# Patient Record
Sex: FEMALE | Race: WHITE | Employment: FULL TIME | URBAN - METROPOLITAN AREA
[De-identification: names, ages, dates, MRNs, and addresses within clinical notes are randomized per-mention and may not be internally consistent; named-entity substitution may affect disease eponyms.]

---

## 2017-12-01 ENCOUNTER — OFFICE VISIT (OUTPATIENT)
Dept: INTERNAL MEDICINE CLINIC | Facility: CLINIC | Age: 57
End: 2017-12-01

## 2017-12-01 ENCOUNTER — TELEPHONE (OUTPATIENT)
Dept: INTERNAL MEDICINE CLINIC | Facility: CLINIC | Age: 57
End: 2017-12-01

## 2017-12-01 VITALS
SYSTOLIC BLOOD PRESSURE: 136 MMHG | HEIGHT: 64.5 IN | DIASTOLIC BLOOD PRESSURE: 70 MMHG | HEART RATE: 76 BPM | RESPIRATION RATE: 18 BRPM | BODY MASS INDEX: 24.62 KG/M2 | TEMPERATURE: 98 F | WEIGHT: 146 LBS

## 2017-12-01 DIAGNOSIS — Z86.011 H/O MENINGIOMA OF THE BRAIN: ICD-10-CM

## 2017-12-01 DIAGNOSIS — Z00.00 ROUTINE PHYSICAL EXAMINATION: Primary | ICD-10-CM

## 2017-12-01 DIAGNOSIS — R92.2 DENSE BREASTS: ICD-10-CM

## 2017-12-01 DIAGNOSIS — Z12.31 ENCOUNTER FOR SCREENING MAMMOGRAM FOR MALIGNANT NEOPLASM OF BREAST: ICD-10-CM

## 2017-12-01 DIAGNOSIS — Z12.11 COLON CANCER SCREENING: ICD-10-CM

## 2017-12-01 PROCEDURE — 99396 PREV VISIT EST AGE 40-64: CPT | Performed by: INTERNAL MEDICINE

## 2017-12-01 RX ORDER — VALACYCLOVIR HYDROCHLORIDE 1 G/1
TABLET, FILM COATED ORAL
COMMUNITY
Start: 2017-11-23 | End: 2019-09-17

## 2017-12-04 NOTE — PROGRESS NOTES
HPI:    Patient ID: Yaneli Lyle is a 62year old female.   Presents for physical exam.  HPI  Patient reports that she has been feeling well, she has history of meningioma left frontal removal in 2005, she was advised to have pediatric surveillance is not erythematous. Left Ear: Ear canal normal. Tympanic membrane is not erythematous. Mouth/Throat: Oropharynx is clear and moist. No posterior oropharyngeal erythema.    Eyes: Conjunctivae and EOM are normal. Pupils are equal, round, and reactive to

## 2017-12-07 ENCOUNTER — TELEPHONE (OUTPATIENT)
Dept: INTERNAL MEDICINE CLINIC | Facility: CLINIC | Age: 57
End: 2017-12-07

## 2017-12-07 NOTE — TELEPHONE ENCOUNTER
Pt said Dr Josesito Jauregui was to order brain MRI with/with out contrast  Order not in Epic     Saw Dr Josesito Jauregui 12/1

## 2017-12-09 ENCOUNTER — TELEPHONE (OUTPATIENT)
Dept: INTERNAL MEDICINE CLINIC | Facility: CLINIC | Age: 57
End: 2017-12-09

## 2017-12-09 DIAGNOSIS — Z86.018 HISTORY OF MENINGIOMA: Primary | ICD-10-CM

## 2017-12-19 ENCOUNTER — LAB ENCOUNTER (OUTPATIENT)
Dept: LAB | Age: 57
End: 2017-12-19
Attending: INTERNAL MEDICINE
Payer: COMMERCIAL

## 2017-12-19 DIAGNOSIS — Z00.00 ROUTINE PHYSICAL EXAMINATION: ICD-10-CM

## 2017-12-19 DIAGNOSIS — Z12.11 COLON CANCER SCREENING: ICD-10-CM

## 2017-12-19 PROCEDURE — 36415 COLL VENOUS BLD VENIPUNCTURE: CPT

## 2017-12-19 PROCEDURE — 84443 ASSAY THYROID STIM HORMONE: CPT

## 2017-12-19 PROCEDURE — 80061 LIPID PANEL: CPT

## 2017-12-19 PROCEDURE — 85025 COMPLETE CBC W/AUTO DIFF WBC: CPT

## 2017-12-19 PROCEDURE — 80053 COMPREHEN METABOLIC PANEL: CPT

## 2018-01-15 ENCOUNTER — APPOINTMENT (OUTPATIENT)
Dept: LAB | Age: 58
End: 2018-01-15
Attending: INTERNAL MEDICINE
Payer: COMMERCIAL

## 2018-01-15 LAB — HEMOCCULT STL QL: NEGATIVE

## 2018-01-15 PROCEDURE — 82274 ASSAY TEST FOR BLOOD FECAL: CPT

## 2018-01-16 ENCOUNTER — HOSPITAL ENCOUNTER (OUTPATIENT)
Dept: MAMMOGRAPHY | Age: 58
Discharge: HOME | End: 2018-01-16
Attending: INTERNAL MEDICINE
Payer: COMMERCIAL

## 2018-01-16 DIAGNOSIS — R92.2 DENSE BREASTS: ICD-10-CM

## 2018-01-16 DIAGNOSIS — Z12.31 ENCOUNTER FOR SCREENING MAMMOGRAM FOR MALIGNANT NEOPLASM OF BREAST: ICD-10-CM

## 2018-01-16 PROCEDURE — 77063 BREAST TOMOSYNTHESIS BI: CPT | Performed by: INTERNAL MEDICINE

## 2018-01-16 PROCEDURE — 77067 SCR MAMMO BI INCL CAD: CPT | Performed by: INTERNAL MEDICINE

## 2018-01-19 ENCOUNTER — PATIENT MESSAGE (OUTPATIENT)
Dept: INTERNAL MEDICINE CLINIC | Facility: CLINIC | Age: 58
End: 2018-01-19

## 2018-01-19 DIAGNOSIS — Z80.9 FAMILY HISTORY OF CANCER: Primary | ICD-10-CM

## 2018-01-20 NOTE — TELEPHONE ENCOUNTER
From: Shanita Yin  To: Aaliyah Vance MD  Sent: 1/19/2018 11:03 AM CST  Subject: Referral Request    Dr. Renata Cain,    My sister was just diagnosed with ovarian cancer stage four so I would appreciate a higher cancer screening referral.     Thank you

## 2018-01-22 ENCOUNTER — HOSPITAL ENCOUNTER (OUTPATIENT)
Dept: MRI IMAGING | Facility: HOSPITAL | Age: 58
Discharge: HOME | End: 2018-01-22
Attending: INTERNAL MEDICINE
Payer: COMMERCIAL

## 2018-01-22 DIAGNOSIS — Z86.018 HISTORY OF MENINGIOMA: ICD-10-CM

## 2018-01-22 PROCEDURE — A9575 INJ GADOTERATE MEGLUMI 0.1ML: HCPCS | Performed by: INTERNAL MEDICINE

## 2018-01-22 PROCEDURE — 70553 MRI BRAIN STEM W/O & W/DYE: CPT | Performed by: INTERNAL MEDICINE

## 2018-11-27 ENCOUNTER — TELEPHONE (OUTPATIENT)
Dept: RHEUMATOLOGY | Facility: CLINIC | Age: 58
End: 2018-11-27

## 2018-11-27 NOTE — TELEPHONE ENCOUNTER
Pt stopped by [place] office dropped off Health Provider Screening form she needs filled out. Pt asking for it to be faxed and pt called once it has been done. Placed in Dr Maggi An folder.

## 2018-11-28 ENCOUNTER — MED REC SCAN ONLY (OUTPATIENT)
Dept: INTERNAL MEDICINE CLINIC | Facility: CLINIC | Age: 58
End: 2018-11-28

## 2019-01-09 ENCOUNTER — TELEPHONE (OUTPATIENT)
Dept: GASTROENTEROLOGY | Facility: CLINIC | Age: 59
End: 2019-01-09

## 2019-01-09 DIAGNOSIS — Z12.11 COLON CANCER SCREENING: Primary | ICD-10-CM

## 2019-01-09 NOTE — TELEPHONE ENCOUNTER
LOV 02/16/16:    Assessment and plan:     This is a 17-year-old female who is a very good candidate for colorectal screening. She is ASA class II. I advised colonoscopy at this time.   The rationale, the risks, benefits, the alternatives, and the miss rat

## 2019-01-09 NOTE — TELEPHONE ENCOUNTER
Chart, medications, and recent visits with Dr. Corazon Pang reviewed.     Okay to schedule colonoscopy directly, diagnosis screening, split dose MiraLAX preparation, MAC at [facility] or IV sedation at \[facility]".

## 2019-01-09 NOTE — TELEPHONE ENCOUNTER
Pt states she got a letter to schedule procedure states she seen dr already and just needs to schedule please call

## 2019-01-14 NOTE — TELEPHONE ENCOUNTER
Scheduled for:  Colonoscopy 74620  Provider Name: Dr. Anais Jerry  Date:  3/7/19  Location:  [facility]  Sedation:  MAC  Time:   0730 (pt is aware to arrive at 0630 )   Prep:  2 day Miralax/Gatorade, sent via DJZ?:  Physician reviewed   Valente Urrutia

## 2019-03-07 ENCOUNTER — ANESTHESIA EVENT (OUTPATIENT)
Dept: ENDOSCOPY | Facility: HOSPITAL | Age: 59
End: 2019-03-07
Payer: COMMERCIAL

## 2019-03-07 ENCOUNTER — HOSPITAL ENCOUNTER (OUTPATIENT)
Facility: HOSPITAL | Age: 59
Setting detail: HOSPITAL OUTPATIENT SURGERY
Discharge: HOME | End: 2019-03-07
Attending: INTERNAL MEDICINE | Admitting: INTERNAL MEDICINE
Payer: COMMERCIAL

## 2019-03-07 ENCOUNTER — ANESTHESIA (OUTPATIENT)
Dept: ENDOSCOPY | Facility: HOSPITAL | Age: 59
End: 2019-03-07
Payer: COMMERCIAL

## 2019-03-07 VITALS
RESPIRATION RATE: 17 BRPM | OXYGEN SATURATION: 98 % | HEIGHT: 64 IN | DIASTOLIC BLOOD PRESSURE: 68 MMHG | HEART RATE: 53 BPM | WEIGHT: 150 LBS | BODY MASS INDEX: 25.61 KG/M2 | SYSTOLIC BLOOD PRESSURE: 107 MMHG

## 2019-03-07 DIAGNOSIS — K64.9 HEMORRHOIDS: Primary | ICD-10-CM

## 2019-03-07 DIAGNOSIS — Z12.11 COLON CANCER SCREENING: ICD-10-CM

## 2019-03-07 PROBLEM — K64.8 INTERNAL HEMORRHOIDS WITHOUT COMPLICATION: Status: ACTIVE | Noted: 2019-03-07

## 2019-03-07 PROCEDURE — 0DJD8ZZ INSPECTION OF LOWER INTESTINAL TRACT, VIA NATURAL OR ARTIFICIAL OPENING ENDOSCOPIC: ICD-10-PCS | Performed by: INTERNAL MEDICINE

## 2019-03-07 PROCEDURE — 45378 DIAGNOSTIC COLONOSCOPY: CPT | Performed by: INTERNAL MEDICINE

## 2019-03-07 RX ORDER — LIDOCAINE HYDROCHLORIDE 10 MG/ML
INJECTION, SOLUTION EPIDURAL; INFILTRATION; INTRACAUDAL; PERINEURAL AS NEEDED
Status: DISCONTINUED | OUTPATIENT
Start: 2019-03-07 | End: 2019-03-07 | Stop reason: SURG

## 2019-03-07 RX ORDER — MIDAZOLAM HYDROCHLORIDE 1 MG/ML
INJECTION INTRAMUSCULAR; INTRAVENOUS AS NEEDED
Status: DISCONTINUED | OUTPATIENT
Start: 2019-03-07 | End: 2019-03-07 | Stop reason: SURG

## 2019-03-07 RX ORDER — SODIUM CHLORIDE, SODIUM LACTATE, POTASSIUM CHLORIDE, CALCIUM CHLORIDE 600; 310; 30; 20 MG/100ML; MG/100ML; MG/100ML; MG/100ML
INJECTION, SOLUTION INTRAVENOUS CONTINUOUS
Status: DISCONTINUED | OUTPATIENT
Start: 2019-03-07 | End: 2019-03-07

## 2019-03-07 RX ADMIN — SODIUM CHLORIDE, SODIUM LACTATE, POTASSIUM CHLORIDE, CALCIUM CHLORIDE: 600; 310; 30; 20 INJECTION, SOLUTION INTRAVENOUS at 07:37:00

## 2019-03-07 RX ADMIN — SODIUM CHLORIDE, SODIUM LACTATE, POTASSIUM CHLORIDE, CALCIUM CHLORIDE: 600; 310; 30; 20 INJECTION, SOLUTION INTRAVENOUS at 07:59:00

## 2019-03-07 RX ADMIN — MIDAZOLAM HYDROCHLORIDE 2 MG: 1 INJECTION INTRAMUSCULAR; INTRAVENOUS at 07:39:00

## 2019-03-07 RX ADMIN — LIDOCAINE HYDROCHLORIDE 30 MG: 10 INJECTION, SOLUTION EPIDURAL; INFILTRATION; INTRACAUDAL; PERINEURAL at 07:39:00

## 2019-03-07 NOTE — BRIEF OP NOTE
Pre-Operative Diagnosis: Colon cancer screening [Z12.11]     Post-Operative Diagnosis: Normal colonoscopy, internal hemorrhoids     Procedure Performed:   Procedure(s):  COLONOSCOPY    Surgeon(s) and Role:     * Arpan Sanchez MD - Primary

## 2019-03-07 NOTE — ANESTHESIA POSTPROCEDURE EVALUATION
Patient: Travis Jackson    Procedure Summary     Date:  03/07/19 Room / Location:  [facility] ENDOSCOPY 05 / [facility] ENDOSCOPY    Anesthesia Start:  0124 Anesthesia Stop:  0804    Procedure:  COLONOSCOPY (N/A ) Diagnosis:       Colon cancer screening      (Norm

## 2019-03-07 NOTE — H&P
History & Physical Examination    Patient Name: Ana Maria Wagner  MRN: [id number]  [facility]: [id number]  YOB: 1960    Diagnosis: Colorectal screening      Medications Prior to Admission:  Probiotic Product (PROBIOTIC-10) Oral Cap Take by mouth Marlen Medina, [address] [place] [address] - Gastroenterology  3/7/2019  7:33 AM

## 2019-03-07 NOTE — ANESTHESIA PREPROCEDURE EVALUATION
Anesthesia PreOp Note    HPI:     Luciana Callahan is a 62year old female who presents for preoperative consultation requested by: Hamlet Hansen MD    Date of Surgery: 3/7/2019    Procedure(s):  COLONOSCOPY  Indication: Colon cancer scre Problem Relation Age of Onset   • Diabetes Father    • Arthritis Father         per NG osteoarthritis   • Ovarian Cancer Sister 62     Social History    Socioeconomic History      Marital status:       Spouse name: Not on file      Number of child Helmet: Not Asked        Seat Belt: Not Asked        Self-Exams: Not Asked    Social History Narrative      Not on file      Available pre-op labs reviewed. Vital Signs: Body mass index is 25.75 kg/m².    height is 1.626 m (5' 4\") and weight i

## 2019-03-07 NOTE — OPERATIVE REPORT
[facility]    PATIENT'S NAME: Uriah Fierrobird   ATTENDING PHYSICIAN: Lyla Smalls MD   OPERATING PHYSICIAN: Lyla Smalls MD   PATIENT ACCOUNT#:   [id number]    LOCATION:  [facility] ROOM 4 [facility] 8   [address] [facility] [id number]  EMS/

## 2019-03-08 ENCOUNTER — TELEPHONE (OUTPATIENT)
Dept: GASTROENTEROLOGY | Facility: CLINIC | Age: 59
End: 2019-03-08

## 2019-03-08 NOTE — TELEPHONE ENCOUNTER
Recall colon in 10 years per EBS.  Colon done 03/07/19    Snapshot updated and recall entered per protocol

## 2019-09-10 ENCOUNTER — OFFICE VISIT (OUTPATIENT)
Dept: INTERNAL MEDICINE CLINIC | Facility: CLINIC | Age: 59
End: 2019-09-10
Payer: COMMERCIAL

## 2019-09-10 VITALS
SYSTOLIC BLOOD PRESSURE: 115 MMHG | HEART RATE: 77 BPM | HEIGHT: 64 IN | WEIGHT: 151 LBS | BODY MASS INDEX: 25.78 KG/M2 | DIASTOLIC BLOOD PRESSURE: 70 MMHG

## 2019-09-10 DIAGNOSIS — Z12.31 BREAST CANCER SCREENING BY MAMMOGRAM: ICD-10-CM

## 2019-09-10 DIAGNOSIS — Z12.72 ENCOUNTER FOR PAPANICOLAOU SMEAR OF VAGINA AS PART OF ROUTINE GYNECOLOGICAL EXAMINATION: ICD-10-CM

## 2019-09-10 DIAGNOSIS — Z00.00 PHYSICAL EXAM, ANNUAL: Primary | ICD-10-CM

## 2019-09-10 DIAGNOSIS — Z01.419 ENCOUNTER FOR PAPANICOLAOU SMEAR OF VAGINA AS PART OF ROUTINE GYNECOLOGICAL EXAMINATION: ICD-10-CM

## 2019-09-10 PROCEDURE — 99396 PREV VISIT EST AGE 40-64: CPT | Performed by: INTERNAL MEDICINE

## 2019-09-10 NOTE — PROGRESS NOTES
HPI:    Patient ID: Clemente Clements is a 62year old female. Presents for physical exam GYN exam  HPI   patient has been feeling well, she has no new concerns, she eats healthy and exercises regularly.   Patient is menopausal, she has history of colon /70 (BP Location: Right arm, Patient Position: Sitting, Cuff Size: adult)   Pulse 77   Ht 5' 4\" (1.626 m)   Wt 151 lb (68.5 kg)   LMP 09/17/2010   BMI 25.92 kg/m²    Physical Exam    Constitutional: She is oriented to person, place, and time.  She Physical exam, annual  (primary encounter diagnosis) continue healthy diet regular physical activities, will check labs  Breast cancer screening by mammogram  Encounter for papanicolaou smear of vagina as part of routine gynecological examination follow-

## 2019-09-11 LAB — HPV I/H RISK 1 DNA SPEC QL NAA+PROBE: NEGATIVE

## 2019-09-13 ENCOUNTER — HOSPITAL ENCOUNTER (OUTPATIENT)
Dept: MAMMOGRAPHY | Age: 59
Discharge: HOME | End: 2019-09-13
Attending: INTERNAL MEDICINE
Payer: COMMERCIAL

## 2019-09-13 DIAGNOSIS — Z12.31 BREAST CANCER SCREENING BY MAMMOGRAM: ICD-10-CM

## 2019-09-13 PROCEDURE — 77063 BREAST TOMOSYNTHESIS BI: CPT | Performed by: INTERNAL MEDICINE

## 2019-09-13 PROCEDURE — 77067 SCR MAMMO BI INCL CAD: CPT | Performed by: INTERNAL MEDICINE

## 2019-09-14 ENCOUNTER — LAB ENCOUNTER (OUTPATIENT)
Dept: LAB | Age: 59
End: 2019-09-14
Attending: INTERNAL MEDICINE
Payer: COMMERCIAL

## 2019-09-14 DIAGNOSIS — Z00.00 PHYSICAL EXAM, ANNUAL: ICD-10-CM

## 2019-09-14 LAB
ALBUMIN SERPL-MCNC: 4 G/DL (ref 3.4–5)
ALBUMIN/GLOB SERPL: 1.4 {RATIO} (ref 1–2)
ALP LIVER SERPL-CCNC: 90 U/L (ref 46–118)
ALT SERPL-CCNC: 17 U/L (ref 13–56)
ANION GAP SERPL CALC-SCNC: 6 MMOL/L (ref 0–18)
AST SERPL-CCNC: 14 U/L (ref 15–37)
BASOPHILS # BLD AUTO: 0.04 X10(3) UL (ref 0–0.2)
BASOPHILS NFR BLD AUTO: 0.8 %
BILIRUB SERPL-MCNC: 0.6 MG/DL (ref 0.1–2)
BUN BLD-MCNC: 17 MG/DL (ref 7–18)
BUN/CREAT SERPL: 14.9 (ref 10–20)
CALCIUM BLD-MCNC: 9.3 MG/DL (ref 8.5–10.1)
CHLORIDE SERPL-SCNC: 107 MMOL/L (ref 98–112)
CHOLEST SMN-MCNC: 225 MG/DL (ref ?–200)
CO2 SERPL-SCNC: 28 MMOL/L (ref 21–32)
CREAT BLD-MCNC: 1.14 MG/DL (ref 0.55–1.02)
DEPRECATED RDW RBC AUTO: 41 FL (ref 35.1–46.3)
EOSINOPHIL # BLD AUTO: 0.09 X10(3) UL (ref 0–0.7)
EOSINOPHIL NFR BLD AUTO: 1.9 %
ERYTHROCYTE [DISTWIDTH] IN BLOOD BY AUTOMATED COUNT: 11.9 % (ref 11–15)
GLOBULIN PLAS-MCNC: 2.9 G/DL (ref 2.8–4.4)
GLUCOSE BLD-MCNC: 93 MG/DL (ref 70–99)
HCT VFR BLD AUTO: 38.9 % (ref 35–48)
HDLC SERPL-MCNC: 73 MG/DL (ref 40–59)
HGB BLD-MCNC: 12.8 G/DL (ref 12–16)
IMM GRANULOCYTES # BLD AUTO: 0.01 X10(3) UL (ref 0–1)
IMM GRANULOCYTES NFR BLD: 0.2 %
LDLC SERPL CALC-MCNC: 130 MG/DL (ref ?–100)
LYMPHOCYTES # BLD AUTO: 1.94 X10(3) UL (ref 1–4)
LYMPHOCYTES NFR BLD AUTO: 41.2 %
M PROTEIN MFR SERPL ELPH: 6.9 G/DL (ref 6.4–8.2)
MCH RBC QN AUTO: 31.2 PG (ref 26–34)
MCHC RBC AUTO-ENTMCNC: 32.9 G/DL (ref 31–37)
MCV RBC AUTO: 94.9 FL (ref 80–100)
MONOCYTES # BLD AUTO: 0.46 X10(3) UL (ref 0.1–1)
MONOCYTES NFR BLD AUTO: 9.8 %
NEUTROPHILS # BLD AUTO: 2.17 X10 (3) UL (ref 1.5–7.7)
NEUTROPHILS # BLD AUTO: 2.17 X10(3) UL (ref 1.5–7.7)
NEUTROPHILS NFR BLD AUTO: 46.1 %
NONHDLC SERPL-MCNC: 152 MG/DL (ref ?–130)
OSMOLALITY SERPL CALC.SUM OF ELEC: 293 MOSM/KG (ref 275–295)
PATIENT FASTING: YES
PATIENT FASTING: YES
PLATELET # BLD AUTO: 269 10(3)UL (ref 150–450)
POTASSIUM SERPL-SCNC: 4 MMOL/L (ref 3.5–5.1)
RBC # BLD AUTO: 4.1 X10(6)UL (ref 3.8–5.3)
SODIUM SERPL-SCNC: 141 MMOL/L (ref 136–145)
TRIGL SERPL-MCNC: 112 MG/DL (ref 30–149)
TSI SER-ACNC: 3.48 MIU/ML (ref 0.36–3.74)
VLDLC SERPL CALC-MCNC: 22 MG/DL (ref 0–30)
WBC # BLD AUTO: 4.7 X10(3) UL (ref 4–11)

## 2019-09-14 PROCEDURE — 80053 COMPREHEN METABOLIC PANEL: CPT

## 2019-09-14 PROCEDURE — 80061 LIPID PANEL: CPT

## 2019-09-14 PROCEDURE — 85025 COMPLETE CBC W/AUTO DIFF WBC: CPT

## 2019-09-14 PROCEDURE — 84443 ASSAY THYROID STIM HORMONE: CPT

## 2019-09-14 PROCEDURE — 36415 COLL VENOUS BLD VENIPUNCTURE: CPT

## 2019-09-17 ENCOUNTER — HOSPITAL ENCOUNTER (OUTPATIENT)
Dept: ULTRASOUND IMAGING | Facility: HOSPITAL | Age: 59
Discharge: HOME | End: 2019-09-17
Attending: INTERNAL MEDICINE
Payer: COMMERCIAL

## 2019-09-17 ENCOUNTER — HOSPITAL ENCOUNTER (OUTPATIENT)
Dept: MAMMOGRAPHY | Facility: HOSPITAL | Age: 59
Discharge: HOME | End: 2019-09-17
Attending: INTERNAL MEDICINE
Payer: COMMERCIAL

## 2019-09-17 DIAGNOSIS — R92.8 ABNORMAL MAMMOGRAM: ICD-10-CM

## 2019-09-17 PROCEDURE — 77066 DX MAMMO INCL CAD BI: CPT | Performed by: INTERNAL MEDICINE

## 2019-09-17 PROCEDURE — 77062 BREAST TOMOSYNTHESIS BI: CPT | Performed by: INTERNAL MEDICINE

## 2019-09-17 PROCEDURE — 76642 ULTRASOUND BREAST LIMITED: CPT | Performed by: INTERNAL MEDICINE

## 2019-09-17 NOTE — IMAGING NOTE
This nurse introduced self and role of breast coordinator. Discussed recommended breast biopsy with patient. Pt was recommended by DR Renetta Camarena have a  Left breast ultrasound guided biopsy. She has 2 children dtr 21 son 25. She is a er nurse .  Yohannes compression views. No ultrasound correlate was seen. Six-month follow-up with the unilateral mammogram would be recommended. 2. Small 3 x 4 mm hypoechoic mass in the left upper outer quadrant at 0200 hours.   This could correspond to the mammographic fin effient brilinta   eloquis etc) should be held at the  direction of your physician. Radiology's preference is to hold these medications for 5  days prior to biopsy. Denies usage  age Reviewed US guided biopsy procedure, as below.   You will be lying on sports bra or form fitting bra on day of procedure. Educated patient that this helps with comfort after the biopsy and decrease swelling.   Reviewed results process with patient and shared that pathology results will be available within 2-3 business days of

## 2019-09-18 ENCOUNTER — TELEPHONE (OUTPATIENT)
Dept: OTHER | Age: 59
End: 2019-09-18

## 2019-09-18 NOTE — TELEPHONE ENCOUNTER
Patient returning Dr Marcin Hahn call regarding mammogram results.      Notes recorded by Destin Hester MD on 9/17/2019 at 8:13 PM CDT  lmtcb

## 2019-09-18 NOTE — TELEPHONE ENCOUNTER
Patient calling stated that spoke to Dr Corazon Pang this morning. She tried calling scheduling but can not schedule because no order was put in the system. Put patient on hold and Lync'd Dr Corazon Pang.      [9/18/2019 3:51 PM] Polly Perez MD:   i will put  order

## 2019-09-26 ENCOUNTER — HOSPITAL ENCOUNTER (OUTPATIENT)
Dept: MAMMOGRAPHY | Facility: HOSPITAL | Age: 59
Discharge: HOME | End: 2019-09-26
Attending: INTERNAL MEDICINE
Payer: COMMERCIAL

## 2019-09-26 ENCOUNTER — HOSPITAL ENCOUNTER (OUTPATIENT)
Dept: ULTRASOUND IMAGING | Facility: HOSPITAL | Age: 59
Discharge: HOME | End: 2019-09-26
Attending: INTERNAL MEDICINE
Payer: COMMERCIAL

## 2019-09-26 VITALS — HEART RATE: 73 BPM | SYSTOLIC BLOOD PRESSURE: 131 MMHG | DIASTOLIC BLOOD PRESSURE: 82 MMHG

## 2019-09-26 DIAGNOSIS — N63.20 MASS OF LEFT BREAST: ICD-10-CM

## 2019-09-26 PROCEDURE — 19083 BX BREAST 1ST LESION US IMAG: CPT | Performed by: INTERNAL MEDICINE

## 2019-09-26 PROCEDURE — 77065 DX MAMMO INCL CAD UNI: CPT | Performed by: INTERNAL MEDICINE

## 2019-09-26 PROCEDURE — 88305 TISSUE EXAM BY PATHOLOGIST: CPT | Performed by: INTERNAL MEDICINE

## 2019-09-27 NOTE — IMAGING NOTE
Steffen Monday  is s/p biopsy . Phoned and introduced myself as breast coordinator . Reinforced to patient  post biopsy care and instructions . She was instructed to come back in 2 weeks for a mammogram to confirm clip placement.  There was post b

## 2019-09-30 ENCOUNTER — NURSE NAVIGATOR ENCOUNTER (OUTPATIENT)
Dept: MAMMOGRAPHY | Facility: HOSPITAL | Age: 59
End: 2019-09-30

## 2019-09-30 ENCOUNTER — TELEPHONE (OUTPATIENT)
Dept: MAMMOGRAPHY | Facility: HOSPITAL | Age: 59
End: 2019-09-30

## 2019-09-30 ENCOUNTER — TELEPHONE (OUTPATIENT)
Dept: INTERNAL MEDICINE CLINIC | Facility: CLINIC | Age: 59
End: 2019-09-30

## 2019-09-30 ENCOUNTER — TELEPHONE (OUTPATIENT)
Dept: OTHER | Age: 59
End: 2019-09-30

## 2019-09-30 NOTE — TELEPHONE ENCOUNTER
Nancy Snyder, Breast Coordinator, called to inform Dr. Ghulam Feldman radiologist had subsequent recommendations regarding pt's breast biopsy. Nancy Snyder noted Dr. Ghulam Feldman had talked to pt about the results before final results available.  Nancy Snyder called pt to review updated resu

## 2019-09-30 NOTE — TELEPHONE ENCOUNTER
Returned call responding to email sent to this  Rn. I  Had contacted Mrs Ector Salcedo to inform her that I had reached out to her physician for a dx mammogram with stereotactic biopsy if needed.  Informed Mrs Yovani Mitchell  That we would schedule her t

## 2019-09-30 NOTE — TELEPHONE ENCOUNTER
Spoke to Jonathon Rivero, order placed for requested testing, left message for the patient to call back

## 2019-10-01 ENCOUNTER — TELEPHONE (OUTPATIENT)
Dept: INTERNAL MEDICINE CLINIC | Facility: CLINIC | Age: 59
End: 2019-10-01

## 2019-10-01 NOTE — TELEPHONE ENCOUNTER
Wali Sanchez from the radiology dept called stating pt needs new order for mammogram with ana    Or even notate it in the comments

## 2019-10-01 NOTE — TELEPHONE ENCOUNTER
Spoke to patient, she is aware of radiology recommendation to have left breast diagnostic mammogram and possible stereotactic biopsy if needed, I advised that they place orders, she has already appointment for this coming Thursday

## 2019-10-03 ENCOUNTER — HOSPITAL ENCOUNTER (OUTPATIENT)
Dept: MAMMOGRAPHY | Facility: HOSPITAL | Age: 59
Discharge: HOME | End: 2019-10-03
Attending: INTERNAL MEDICINE
Payer: COMMERCIAL

## 2019-10-03 ENCOUNTER — APPOINTMENT (OUTPATIENT)
Dept: MAMMOGRAPHY | Facility: HOSPITAL | Age: 59
End: 2019-10-03
Attending: INTERNAL MEDICINE
Payer: COMMERCIAL

## 2019-10-03 ENCOUNTER — TELEPHONE (OUTPATIENT)
Dept: MAMMOGRAPHY | Facility: HOSPITAL | Age: 59
End: 2019-10-03

## 2019-10-03 DIAGNOSIS — R92.8 ABNORMAL MAMMOGRAM: ICD-10-CM

## 2019-10-03 DIAGNOSIS — R92.8 ABNORMAL MAMMOGRAM OF LEFT BREAST: ICD-10-CM

## 2019-10-03 NOTE — TELEPHONE ENCOUNTER
Dr Gabby Mendez requesting this rn contact Mrs Stephanie Cruz . After reviewing images  Dr Ynes Centeno the area they are looking at today is very small and that the hematoma may not be cleared enough to see the biopsy site .  If  Dr Selin Taylor

## 2019-10-10 ENCOUNTER — TELEPHONE (OUTPATIENT)
Dept: INTERNAL MEDICINE CLINIC | Facility: CLINIC | Age: 59
End: 2019-10-10

## 2019-10-10 NOTE — TELEPHONE ENCOUNTER
Pt came in to [facility]  to drop off [address] forms, placed in Herb Villagomez. Please fax to number on form and call pt when completed.

## 2020-03-02 ENCOUNTER — HOSPITAL ENCOUNTER (OUTPATIENT)
Dept: MRI IMAGING | Facility: HOSPITAL | Age: 60
Discharge: HOME | End: 2020-03-02
Attending: ORTHOPAEDIC SURGERY
Payer: COMMERCIAL

## 2020-03-02 DIAGNOSIS — M79.18 BUTTOCK PAIN: ICD-10-CM

## 2020-03-02 PROCEDURE — 72195 MRI PELVIS W/O DYE: CPT | Performed by: ORTHOPAEDIC SURGERY

## 2020-03-02 PROCEDURE — 72148 MRI LUMBAR SPINE W/O DYE: CPT | Performed by: ORTHOPAEDIC SURGERY

## 2020-03-13 ENCOUNTER — ORDER TRANSCRIPTION (OUTPATIENT)
Dept: ADMINISTRATIVE | Facility: HOSPITAL | Age: 60
End: 2020-03-13

## 2020-03-13 ENCOUNTER — HOSPITAL ENCOUNTER (OUTPATIENT)
Dept: BONE DENSITY | Age: 60
Discharge: HOME | End: 2020-03-13
Attending: INTERNAL MEDICINE
Payer: COMMERCIAL

## 2020-03-13 DIAGNOSIS — N95.1 MENOPAUSAL STATE: ICD-10-CM

## 2020-03-13 DIAGNOSIS — Z13.9 SCREENING PROCEDURE: Primary | ICD-10-CM

## 2020-03-13 PROCEDURE — 77080 DXA BONE DENSITY AXIAL: CPT | Performed by: INTERNAL MEDICINE

## 2020-04-14 ENCOUNTER — HOSPITAL ENCOUNTER (OUTPATIENT)
Dept: MAMMOGRAPHY | Facility: HOSPITAL | Age: 60
Discharge: HOME | End: 2020-04-14
Attending: INTERNAL MEDICINE
Payer: COMMERCIAL

## 2020-04-14 DIAGNOSIS — R92.8 ABNORMAL MAMMOGRAM OF BOTH BREASTS: ICD-10-CM

## 2020-04-14 PROCEDURE — 77062 BREAST TOMOSYNTHESIS BI: CPT | Performed by: INTERNAL MEDICINE

## 2020-04-14 PROCEDURE — 77066 DX MAMMO INCL CAD BI: CPT | Performed by: INTERNAL MEDICINE

## 2020-05-15 ENCOUNTER — TELEPHONE (OUTPATIENT)
Dept: RHEUMATOLOGY | Facility: CLINIC | Age: 60
End: 2020-05-15

## 2020-05-15 NOTE — TELEPHONE ENCOUNTER
Patient scheduled appointment for 5/18 and called in to do travel screening. Patient works at the [employer] for [employer], so she is exposed to patients with COVID daily. Patient noted that she is always in full PPE and does not have any symptoms.

## 2020-05-18 ENCOUNTER — OFFICE VISIT (OUTPATIENT)
Dept: RHEUMATOLOGY | Facility: CLINIC | Age: 60
End: 2020-05-18
Payer: COMMERCIAL

## 2020-05-18 ENCOUNTER — HOSPITAL ENCOUNTER (OUTPATIENT)
Dept: GENERAL RADIOLOGY | Facility: HOSPITAL | Age: 60
Discharge: HOME | End: 2020-05-18
Attending: INTERNAL MEDICINE
Payer: COMMERCIAL

## 2020-05-18 ENCOUNTER — APPOINTMENT (OUTPATIENT)
Dept: LAB | Facility: HOSPITAL | Age: 60
End: 2020-05-18
Attending: INTERNAL MEDICINE
Payer: COMMERCIAL

## 2020-05-18 VITALS
BODY MASS INDEX: 23.59 KG/M2 | WEIGHT: 138.19 LBS | DIASTOLIC BLOOD PRESSURE: 60 MMHG | HEART RATE: 75 BPM | HEIGHT: 64 IN | SYSTOLIC BLOOD PRESSURE: 110 MMHG

## 2020-05-18 DIAGNOSIS — M79.642 BILATERAL HAND PAIN: Primary | ICD-10-CM

## 2020-05-18 DIAGNOSIS — E55.9 VITAMIN D DEFICIENCY: ICD-10-CM

## 2020-05-18 DIAGNOSIS — M79.641 BILATERAL HAND PAIN: ICD-10-CM

## 2020-05-18 DIAGNOSIS — M79.641 BILATERAL HAND PAIN: Primary | ICD-10-CM

## 2020-05-18 DIAGNOSIS — M19.041 PRIMARY OSTEOARTHRITIS OF BOTH HANDS: ICD-10-CM

## 2020-05-18 DIAGNOSIS — M79.642 BILATERAL HAND PAIN: ICD-10-CM

## 2020-05-18 DIAGNOSIS — M19.042 PRIMARY OSTEOARTHRITIS OF BOTH HANDS: ICD-10-CM

## 2020-05-18 DIAGNOSIS — R89.9 ABNORMAL LABORATORY TEST RESULT: ICD-10-CM

## 2020-05-18 PROCEDURE — 85652 RBC SED RATE AUTOMATED: CPT | Performed by: INTERNAL MEDICINE

## 2020-05-18 PROCEDURE — 86431 RHEUMATOID FACTOR QUANT: CPT | Performed by: INTERNAL MEDICINE

## 2020-05-18 PROCEDURE — 86200 CCP ANTIBODY: CPT | Performed by: INTERNAL MEDICINE

## 2020-05-18 PROCEDURE — 82306 VITAMIN D 25 HYDROXY: CPT

## 2020-05-18 PROCEDURE — 73130 X-RAY EXAM OF HAND: CPT | Performed by: INTERNAL MEDICINE

## 2020-05-18 PROCEDURE — 80048 BASIC METABOLIC PNL TOTAL CA: CPT

## 2020-05-18 PROCEDURE — 36415 COLL VENOUS BLD VENIPUNCTURE: CPT | Performed by: INTERNAL MEDICINE

## 2020-05-18 PROCEDURE — 86140 C-REACTIVE PROTEIN: CPT | Performed by: INTERNAL MEDICINE

## 2020-05-18 PROCEDURE — 99244 OFF/OP CNSLTJ NEW/EST MOD 40: CPT | Performed by: INTERNAL MEDICINE

## 2020-05-18 NOTE — PATIENT INSTRUCTIONS
You were seen today for hand pain  Seems like your symptoms are more osteoarthritis related  Lets get more blood work

## 2020-05-18 NOTE — PROGRESS NOTES
Javier Zayas is a 61year old female who presents for Patient presents with:  Hand Pain: bilateral hand pain x 6 months, nodules on both hands   .    HPI:   CC: b/l hand pain  Consult: referred by PCP Dr. Miguel Wilson    This is a 62 yo F with gx of [address] HISTORY Right 1997    per NG benign removed (rt arm)   • REPAIR ROTATOR CUFF,ACUTE     • TUBAL LIGATION     • UPPER ARM/ELBOW SURGERY UNLISTED  2010    per NG shoulder surgery      Family History   Problem Relation Age of Onset   • Diabetes Father    • Art DIP, PIP and MCP, strong full fists, +heberden nodes   Wrist: FROM, no pain or swelling or warmth on palpation  Elbow: FROM, no pain or swelling or warmth on palpation  Shoulders: FROM, no pain or swelling or warmth on palpation  Hip: normal log roll, no l

## 2020-07-01 DIAGNOSIS — Z78.9 PARTICIPANT IN HEALTH AND WELLNESS PLAN: Primary | ICD-10-CM

## 2020-07-02 ENCOUNTER — NURSE ONLY (OUTPATIENT)
Dept: LAB | Age: 60
End: 2020-07-02
Attending: PREVENTIVE MEDICINE

## 2020-07-02 DIAGNOSIS — Z78.9 PARTICIPANT IN HEALTH AND WELLNESS PLAN: ICD-10-CM

## 2020-07-02 PROCEDURE — 86769 SARS-COV-2 COVID-19 ANTIBODY: CPT

## 2020-07-03 LAB — SARS-COV-2 IGG SERPLBLD QL IA.RAPID: NEGATIVE

## 2020-10-12 ENCOUNTER — HOSPITAL ENCOUNTER (OUTPATIENT)
Dept: MAMMOGRAPHY | Facility: HOSPITAL | Age: 60
Discharge: HOME | End: 2020-10-12
Attending: INTERNAL MEDICINE
Payer: COMMERCIAL

## 2020-10-12 DIAGNOSIS — R92.8 ABNORMAL MAMMOGRAM OF BOTH BREASTS: ICD-10-CM

## 2020-10-12 PROCEDURE — 77062 BREAST TOMOSYNTHESIS BI: CPT | Performed by: INTERNAL MEDICINE

## 2020-10-12 PROCEDURE — 77066 DX MAMMO INCL CAD BI: CPT | Performed by: INTERNAL MEDICINE

## 2020-11-13 ENCOUNTER — TELEPHONE (OUTPATIENT)
Dept: INTERNAL MEDICINE CLINIC | Facility: CLINIC | Age: 60
End: 2020-11-13

## 2020-11-13 NOTE — TELEPHONE ENCOUNTER
1st call - left message to patient voice mail.
Patient called and advised that she needs to have her physical done before the end of November for her work. Is there any way we can squeeze her in on another day? She advised she works 4 days a week and her scheduled is inconsistent. She advised she is off work on 11/17 & 11/9. She is at work and we CAN leave a voicemail for her if she does not answer      Please Advise.
Scheduled for 11/17
pelase fit pt in on 11-, use SDS
Normal rate, regular rhythm, normal S1, S2 heart sounds heard.

## 2020-11-14 ENCOUNTER — LAB ENCOUNTER (OUTPATIENT)
Dept: LAB | Age: 60
End: 2020-11-14
Attending: INTERNAL MEDICINE
Payer: COMMERCIAL

## 2020-11-14 DIAGNOSIS — Z00.00 PHYSICAL EXAM, ANNUAL: ICD-10-CM

## 2020-11-14 DIAGNOSIS — R89.9 ABNORMAL LABORATORY TEST RESULT: ICD-10-CM

## 2020-11-14 PROCEDURE — 80061 LIPID PANEL: CPT

## 2020-11-14 PROCEDURE — 85025 COMPLETE CBC W/AUTO DIFF WBC: CPT

## 2020-11-14 PROCEDURE — 80053 COMPREHEN METABOLIC PANEL: CPT

## 2020-11-14 PROCEDURE — 84443 ASSAY THYROID STIM HORMONE: CPT

## 2020-11-14 PROCEDURE — 36415 COLL VENOUS BLD VENIPUNCTURE: CPT

## 2020-11-17 ENCOUNTER — OFFICE VISIT (OUTPATIENT)
Dept: INTERNAL MEDICINE CLINIC | Facility: CLINIC | Age: 60
End: 2020-11-17
Payer: COMMERCIAL

## 2020-11-17 VITALS
HEIGHT: 64 IN | HEART RATE: 68 BPM | BODY MASS INDEX: 22.88 KG/M2 | WEIGHT: 134 LBS | DIASTOLIC BLOOD PRESSURE: 75 MMHG | TEMPERATURE: 98 F | SYSTOLIC BLOOD PRESSURE: 126 MMHG | RESPIRATION RATE: 18 BRPM

## 2020-11-17 DIAGNOSIS — Z00.00 PHYSICAL EXAM, ANNUAL: Primary | ICD-10-CM

## 2020-11-17 PROCEDURE — 99072 ADDL SUPL MATRL&STAF TM PHE: CPT | Performed by: INTERNAL MEDICINE

## 2020-11-17 PROCEDURE — 3078F DIAST BP <80 MM HG: CPT | Performed by: INTERNAL MEDICINE

## 2020-11-17 PROCEDURE — 3008F BODY MASS INDEX DOCD: CPT | Performed by: INTERNAL MEDICINE

## 2020-11-17 PROCEDURE — 99396 PREV VISIT EST AGE 40-64: CPT | Performed by: INTERNAL MEDICINE

## 2020-11-17 PROCEDURE — 3074F SYST BP LT 130 MM HG: CPT | Performed by: INTERNAL MEDICINE

## 2020-11-22 NOTE — PROGRESS NOTES
HPI:    Patient ID: Allison Saldivar is a 61year old female presents for physical exam    HPI  Patient reports that she has been doing well, she is exercising regularly, eats healthy, she works as a nurse at [employer].   She sees gynecology normal extraocular motion is intact  Ears/Audiometry: tympanic membranes are normal bilaterally hearing is grossly intact  Nose/Mouth/Throat: nose and throat are clear palate is intact mucous membranes are moist no oral lesions are noted  Neck/Thyroid: [employer]

## 2021-01-05 ENCOUNTER — IMMUNIZATION (OUTPATIENT)
Dept: LAB | Facility: HOSPITAL | Age: 61
End: 2021-01-05
Attending: PREVENTIVE MEDICINE

## 2021-01-05 DIAGNOSIS — Z23 NEED FOR VACCINATION: ICD-10-CM

## 2021-01-05 PROCEDURE — 0011A SARSCOV2 VAC 100MCG/0.5ML IM: CPT

## 2021-02-02 ENCOUNTER — IMMUNIZATION (OUTPATIENT)
Dept: LAB | Facility: HOSPITAL | Age: 61
End: 2021-02-02
Attending: PREVENTIVE MEDICINE

## 2021-02-02 DIAGNOSIS — Z23 NEED FOR VACCINATION: Primary | ICD-10-CM

## 2021-02-02 PROCEDURE — 0012A SARSCOV2 VAC 100MCG/0.5ML IM: CPT

## 2021-10-27 ENCOUNTER — TELEPHONE (OUTPATIENT)
Dept: INTERNAL MEDICINE CLINIC | Facility: CLINIC | Age: 61
End: 2021-10-27

## 2021-11-09 ENCOUNTER — HOSPITAL ENCOUNTER (OUTPATIENT)
Dept: MAMMOGRAPHY | Age: 61
Discharge: HOME | End: 2021-11-09
Attending: INTERNAL MEDICINE
Payer: COMMERCIAL

## 2021-11-09 DIAGNOSIS — Z12.31 ENCOUNTER FOR SCREENING MAMMOGRAM FOR MALIGNANT NEOPLASM OF BREAST: ICD-10-CM

## 2021-11-09 PROCEDURE — 77063 BREAST TOMOSYNTHESIS BI: CPT | Performed by: INTERNAL MEDICINE

## 2021-11-09 PROCEDURE — 77067 SCR MAMMO BI INCL CAD: CPT | Performed by: INTERNAL MEDICINE

## 2021-11-13 ENCOUNTER — LAB ENCOUNTER (OUTPATIENT)
Dept: LAB | Facility: HOSPITAL | Age: 61
End: 2021-11-13
Attending: INTERNAL MEDICINE
Payer: COMMERCIAL

## 2021-11-13 DIAGNOSIS — Z00.00 PHYSICAL EXAM, ANNUAL: ICD-10-CM

## 2021-11-13 PROCEDURE — 84443 ASSAY THYROID STIM HORMONE: CPT

## 2021-11-13 PROCEDURE — 85025 COMPLETE CBC W/AUTO DIFF WBC: CPT

## 2021-11-13 PROCEDURE — 80053 COMPREHEN METABOLIC PANEL: CPT

## 2021-11-13 PROCEDURE — 36415 COLL VENOUS BLD VENIPUNCTURE: CPT

## 2021-11-13 PROCEDURE — 80061 LIPID PANEL: CPT

## 2021-11-18 ENCOUNTER — HOSPITAL ENCOUNTER (OUTPATIENT)
Dept: MRI IMAGING | Facility: HOSPITAL | Age: 61
Discharge: HOME | End: 2021-11-18
Attending: INTERNAL MEDICINE
Payer: COMMERCIAL

## 2021-11-18 DIAGNOSIS — Z86.018 PERSONAL HISTORY OF OTHER BENIGN NEOPLASM: ICD-10-CM

## 2021-11-18 DIAGNOSIS — Z12.31 ENCOUNTER FOR SCREENING MAMMOGRAM FOR MALIGNANT NEOPLASM OF BREAST: ICD-10-CM

## 2021-11-18 PROCEDURE — A9575 INJ GADOTERATE MEGLUMI 0.1ML: HCPCS | Performed by: INTERNAL MEDICINE

## 2021-11-18 PROCEDURE — 70553 MRI BRAIN STEM W/O & W/DYE: CPT | Performed by: INTERNAL MEDICINE

## 2021-11-19 ENCOUNTER — OFFICE VISIT (OUTPATIENT)
Dept: INTERNAL MEDICINE CLINIC | Facility: CLINIC | Age: 61
End: 2021-11-19
Payer: COMMERCIAL

## 2021-11-19 VITALS
DIASTOLIC BLOOD PRESSURE: 70 MMHG | HEIGHT: 64 IN | RESPIRATION RATE: 18 BRPM | HEART RATE: 71 BPM | BODY MASS INDEX: 23.25 KG/M2 | WEIGHT: 136.19 LBS | SYSTOLIC BLOOD PRESSURE: 114 MMHG

## 2021-11-19 DIAGNOSIS — Z00.00 PHYSICAL EXAM, ANNUAL: Primary | ICD-10-CM

## 2021-11-19 PROCEDURE — 3008F BODY MASS INDEX DOCD: CPT | Performed by: INTERNAL MEDICINE

## 2021-11-19 PROCEDURE — 99396 PREV VISIT EST AGE 40-64: CPT | Performed by: INTERNAL MEDICINE

## 2021-11-19 PROCEDURE — 3078F DIAST BP <80 MM HG: CPT | Performed by: INTERNAL MEDICINE

## 2021-11-19 PROCEDURE — 3074F SYST BP LT 130 MM HG: CPT | Performed by: INTERNAL MEDICINE

## 2021-11-24 ENCOUNTER — TELEPHONE (OUTPATIENT)
Dept: INTERNAL MEDICINE CLINIC | Facility: CLINIC | Age: 61
End: 2021-11-24

## 2021-11-24 NOTE — TELEPHONE ENCOUNTER
Patient dropped off a health provider screening form to be filled out by Dr Maryuri Gibson. Form needs to be faxed to fax number on the form when completed. Form placed in drs folder.

## 2022-01-05 ENCOUNTER — HOSPITAL ENCOUNTER (OUTPATIENT)
Age: 62
Discharge: HOME | End: 2022-01-05
Payer: COMMERCIAL

## 2022-01-05 VITALS
RESPIRATION RATE: 17 BRPM | OXYGEN SATURATION: 100 % | SYSTOLIC BLOOD PRESSURE: 124 MMHG | HEIGHT: 65 IN | WEIGHT: 136 LBS | HEART RATE: 71 BPM | TEMPERATURE: 97 F | DIASTOLIC BLOOD PRESSURE: 73 MMHG | BODY MASS INDEX: 22.66 KG/M2

## 2022-01-05 DIAGNOSIS — Z20.822 EXPOSURE TO COVID-19 VIRUS: Primary | ICD-10-CM

## 2022-01-05 LAB — SARS-COV-2 RNA RESP QL NAA+PROBE: NOT DETECTED

## 2022-01-05 PROCEDURE — U0002 COVID-19 LAB TEST NON-CDC: HCPCS | Performed by: NURSE PRACTITIONER

## 2022-01-05 PROCEDURE — 99212 OFFICE O/P EST SF 10 MIN: CPT | Performed by: NURSE PRACTITIONER

## 2022-01-05 NOTE — ED PROVIDER NOTES
Patient Seen in: [facility]    History   CC: covid testing  HPI: Traci Harper 64year old female  who presents w/ sore throat, headache and cough beginning yesterday.  tested ++ today. No fever, sob, difficulty swallowing.  No GI Chew Tab,  Chew 2 each by mouth daily. Probiotic Product (PROBIOTIC-10) Oral Cap,  Take by mouth. Constitutional and vital signs reviewed.         Physical Exam     ED Triage Vitals [01/05/22 1611]   /73   Pulse 71   Resp 17   Temp 97.2 °F appointment as soon as possible for a visit in 2 days        Medications Prescribed:  Current Discharge Medication List

## 2022-01-07 ENCOUNTER — HOSPITAL ENCOUNTER (OUTPATIENT)
Age: 62
Discharge: HOME | End: 2022-01-07
Payer: COMMERCIAL

## 2022-01-07 VITALS
SYSTOLIC BLOOD PRESSURE: 138 MMHG | RESPIRATION RATE: 16 BRPM | TEMPERATURE: 97 F | OXYGEN SATURATION: 100 % | HEART RATE: 55 BPM | DIASTOLIC BLOOD PRESSURE: 86 MMHG

## 2022-01-07 DIAGNOSIS — U07.1 COVID: Primary | ICD-10-CM

## 2022-01-07 LAB — SARS-COV-2 RNA RESP QL NAA+PROBE: DETECTED

## 2022-01-07 PROCEDURE — 99213 OFFICE O/P EST LOW 20 MIN: CPT | Performed by: NURSE PRACTITIONER

## 2022-01-07 PROCEDURE — U0002 COVID-19 LAB TEST NON-CDC: HCPCS | Performed by: NURSE PRACTITIONER

## 2022-01-07 NOTE — ED PROVIDER NOTES
Patient Seen in: [facility]      History   Patient presents with:  Testing  Headache    Stated Complaint: testing; headahce,sore throat    Subjective:   HPI    Employee with headache, nasal congestion and sore throat this morning.   No diffic Temp 97.3 °F (36.3 °C) (Temporal)   Resp 16   LMP 09/17/2010   SpO2 100%         Physical Exam  Vitals and nursing note reviewed. HENT:      Mouth/Throat:      Pharynx: Posterior oropharyngeal erythema present.    Cardiovascular:      Rate and Rhythm: Nor

## 2022-01-10 ENCOUNTER — TELEPHONE (OUTPATIENT)
Dept: INTERNAL MEDICINE CLINIC | Facility: HOSPITAL | Age: 62
End: 2022-01-10

## 2022-01-10 NOTE — TELEPHONE ENCOUNTER
Outside Covid Testing done    Results and RTW guidelines:    COVID RESULT reported:      Test type:    [x] Rapid outside         [] PCR outside       [] Home Test    Date of test: 1/7/2022    Test location: [facility]        [x] Result viewed in Epic with verbal communication open with management about RTW and if symptoms worsen     Notes:     RTW PLAN:    []  If COVID positive results, off work minimum of 5 days from positive test or onset of symptoms (day 0)    [x]      On day 5, if asymptomatic or mildly sympto onset: 1/7/2022    SYMPTOMS:  [] asymptomatic    • Fever > 100F               Yes []          • Cough                          Yes [x]      • Shortness of breath  Yes []      • Congestion                 Yes [x]      • Runny nose                Yes [x]

## 2022-02-06 ENCOUNTER — HOSPITAL ENCOUNTER (OUTPATIENT)
Age: 62
Discharge: HOME | End: 2022-02-06
Payer: COMMERCIAL

## 2022-02-06 VITALS
DIASTOLIC BLOOD PRESSURE: 68 MMHG | OXYGEN SATURATION: 100 % | TEMPERATURE: 97 F | HEART RATE: 58 BPM | SYSTOLIC BLOOD PRESSURE: 147 MMHG | RESPIRATION RATE: 16 BRPM

## 2022-02-06 DIAGNOSIS — H57.89 IRRITATION OF RIGHT EYE: Primary | ICD-10-CM

## 2022-02-06 PROCEDURE — 99213 OFFICE O/P EST LOW 20 MIN: CPT | Performed by: NURSE PRACTITIONER

## 2022-02-06 RX ORDER — TOBRAMYCIN 3 MG/ML
2 SOLUTION/ DROPS OPHTHALMIC EVERY 6 HOURS
Qty: 1 EACH | Refills: 0 | Status: SHIPPED | OUTPATIENT
Start: 2022-02-06 | End: 2022-02-13

## 2022-02-08 ENCOUNTER — IMMUNIZATION (OUTPATIENT)
Dept: LAB | Age: 62
End: 2022-02-08
Attending: EMERGENCY MEDICINE
Payer: COMMERCIAL

## 2022-02-08 DIAGNOSIS — Z23 NEED FOR VACCINATION: Primary | ICD-10-CM

## 2022-02-08 PROCEDURE — 0064A SARSCOV2 VAC 50MCG/0.25ML IM: CPT

## 2022-05-05 ENCOUNTER — APPOINTMENT (OUTPATIENT)
Dept: URBAN - METROPOLITAN AREA CLINIC 244 | Age: 62
Setting detail: DERMATOLOGY
End: 2022-05-05

## 2022-05-05 DIAGNOSIS — L81.4 OTHER MELANIN HYPERPIGMENTATION: ICD-10-CM

## 2022-05-05 DIAGNOSIS — Z85.828 PERSONAL HISTORY OF OTHER MALIGNANT NEOPLASM OF SKIN: ICD-10-CM

## 2022-05-05 DIAGNOSIS — L82.1 OTHER SEBORRHEIC KERATOSIS: ICD-10-CM

## 2022-05-05 DIAGNOSIS — D22 MELANOCYTIC NEVI: ICD-10-CM

## 2022-05-05 PROBLEM — D22.5 MELANOCYTIC NEVI OF TRUNK: Status: ACTIVE | Noted: 2022-05-05

## 2022-05-05 PROCEDURE — OTHER COUNSELING: OTHER

## 2022-05-05 PROCEDURE — 99213 OFFICE O/P EST LOW 20 MIN: CPT

## 2022-05-05 ASSESSMENT — LOCATION DETAILED DESCRIPTION DERM
LOCATION DETAILED: RIGHT SUPERIOR MEDIAL UPPER BACK
LOCATION DETAILED: LEFT MEDIAL UPPER BACK
LOCATION DETAILED: LEFT SUPERIOR MEDIAL MALAR CHEEK
LOCATION DETAILED: UPPER STERNUM

## 2022-05-05 ASSESSMENT — LOCATION SIMPLE DESCRIPTION DERM
LOCATION SIMPLE: LEFT CHEEK
LOCATION SIMPLE: CHEST
LOCATION SIMPLE: RIGHT UPPER BACK
LOCATION SIMPLE: LEFT UPPER BACK

## 2022-05-05 ASSESSMENT — LOCATION ZONE DERM
LOCATION ZONE: FACE
LOCATION ZONE: TRUNK

## 2022-08-03 ENCOUNTER — HOSPITAL ENCOUNTER (OUTPATIENT)
Age: 62
Discharge: HOME | End: 2022-08-03
Attending: EMERGENCY MEDICINE
Payer: COMMERCIAL

## 2022-08-03 ENCOUNTER — APPOINTMENT (OUTPATIENT)
Dept: CT IMAGING | Age: 62
End: 2022-08-03
Attending: EMERGENCY MEDICINE
Payer: COMMERCIAL

## 2022-08-03 VITALS
TEMPERATURE: 98 F | RESPIRATION RATE: 18 BRPM | DIASTOLIC BLOOD PRESSURE: 67 MMHG | OXYGEN SATURATION: 100 % | HEART RATE: 74 BPM | SYSTOLIC BLOOD PRESSURE: 129 MMHG

## 2022-08-03 DIAGNOSIS — N20.1 URETEROLITHIASIS: Primary | ICD-10-CM

## 2022-08-03 LAB
#MXD IC: 0.2 X10ˆ3/UL (ref 0.1–1)
BILIRUB UR QL STRIP: NEGATIVE
BUN BLD-MCNC: 22 MG/DL (ref 7–18)
CHLORIDE BLD-SCNC: 106 MMOL/L (ref 98–112)
CLARITY UR: CLEAR
CO2 BLD-SCNC: 25 MMOL/L (ref 21–32)
COLOR UR: YELLOW
CREAT BLD-MCNC: 1.1 MG/DL
GFR SERPLBLD BASED ON 1.73 SQ M-ARVRAT: 57 ML/MIN/1.73M2 (ref 60–?)
GLUCOSE BLD-MCNC: 128 MG/DL (ref 70–99)
GLUCOSE UR STRIP-MCNC: NEGATIVE MG/DL
HCT VFR BLD AUTO: 38 %
HCT VFR BLD CALC: 39 %
HGB BLD-MCNC: 12.7 G/DL
ISTAT IONIZED CALCIUM FOR CHEM 8: 1.29 MMOL/L (ref 1.12–1.32)
KETONES UR STRIP-MCNC: NEGATIVE MG/DL
LYMPHOCYTES # BLD AUTO: 1.5 X10ˆ3/UL (ref 1–4)
LYMPHOCYTES NFR BLD AUTO: 18.9 %
MCH RBC QN AUTO: 31 PG (ref 26–34)
MCHC RBC AUTO-ENTMCNC: 33.4 G/DL (ref 31–37)
MCV RBC AUTO: 92.7 FL (ref 80–100)
MIXED CELL %: 2.6 %
NEUTROPHILS # BLD AUTO: 6.4 X10ˆ3/UL (ref 1.5–7.7)
NEUTROPHILS NFR BLD AUTO: 78.5 %
NITRITE UR QL STRIP: NEGATIVE
PH UR STRIP: 6.5 [PH]
PLATELET # BLD AUTO: 261 X10ˆ3/UL (ref 150–450)
POTASSIUM BLD-SCNC: 4 MMOL/L (ref 3.6–5.1)
RBC # BLD AUTO: 4.1 X10ˆ6/UL
SODIUM BLD-SCNC: 139 MMOL/L (ref 136–145)
SP GR UR STRIP: >=1.03
UROBILINOGEN UR STRIP-ACNC: <2 MG/DL
WBC # BLD AUTO: 8.1 X10ˆ3/UL (ref 4–11)

## 2022-08-03 PROCEDURE — 85025 COMPLETE CBC W/AUTO DIFF WBC: CPT | Performed by: EMERGENCY MEDICINE

## 2022-08-03 PROCEDURE — 96361 HYDRATE IV INFUSION ADD-ON: CPT

## 2022-08-03 PROCEDURE — 81002 URINALYSIS NONAUTO W/O SCOPE: CPT

## 2022-08-03 PROCEDURE — 74176 CT ABD & PELVIS W/O CONTRAST: CPT | Performed by: EMERGENCY MEDICINE

## 2022-08-03 PROCEDURE — 80047 BASIC METABLC PNL IONIZED CA: CPT

## 2022-08-03 PROCEDURE — 96376 TX/PRO/DX INJ SAME DRUG ADON: CPT

## 2022-08-03 PROCEDURE — 87086 URINE CULTURE/COLONY COUNT: CPT | Performed by: EMERGENCY MEDICINE

## 2022-08-03 PROCEDURE — 99214 OFFICE O/P EST MOD 30 MIN: CPT

## 2022-08-03 PROCEDURE — 96374 THER/PROPH/DIAG INJ IV PUSH: CPT

## 2022-08-03 RX ORDER — SODIUM CHLORIDE 9 MG/ML
1000 INJECTION, SOLUTION INTRAVENOUS ONCE
Status: COMPLETED | OUTPATIENT
Start: 2022-08-03 | End: 2022-08-03

## 2022-08-03 RX ORDER — KETOROLAC TROMETHAMINE 30 MG/ML
15 INJECTION, SOLUTION INTRAMUSCULAR; INTRAVENOUS ONCE
Status: COMPLETED | OUTPATIENT
Start: 2022-08-03 | End: 2022-08-03

## 2022-08-03 RX ORDER — TAMSULOSIN HYDROCHLORIDE 0.4 MG/1
0.4 CAPSULE ORAL DAILY
Qty: 7 CAPSULE | Refills: 0 | Status: SHIPPED | OUTPATIENT
Start: 2022-08-03 | End: 2022-08-03

## 2022-08-03 RX ORDER — FLUCONAZOLE 150 MG/1
150 TABLET ORAL ONCE
Qty: 1 TABLET | Refills: 0 | Status: SHIPPED | OUTPATIENT
Start: 2022-08-03 | End: 2022-08-03

## 2022-08-03 RX ORDER — SULFAMETHOXAZOLE AND TRIMETHOPRIM 800; 160 MG/1; MG/1
1 TABLET ORAL 2 TIMES DAILY
Qty: 14 TABLET | Refills: 0 | Status: SHIPPED | OUTPATIENT
Start: 2022-08-03 | End: 2022-08-10

## 2022-08-03 RX ORDER — ONDANSETRON 4 MG/1
4 TABLET, ORALLY DISINTEGRATING ORAL EVERY 6 HOURS PRN
Qty: 10 TABLET | Refills: 0 | Status: SHIPPED | OUTPATIENT
Start: 2022-08-03 | End: 2022-08-10

## 2022-08-03 RX ORDER — TRAMADOL HYDROCHLORIDE 50 MG/1
TABLET ORAL EVERY 6 HOURS PRN
Qty: 20 TABLET | Refills: 0 | Status: SHIPPED | OUTPATIENT
Start: 2022-08-03

## 2022-08-03 RX ORDER — TAMSULOSIN HYDROCHLORIDE 0.4 MG/1
0.4 CAPSULE ORAL DAILY
Qty: 7 CAPSULE | Refills: 0 | Status: SHIPPED | OUTPATIENT
Start: 2022-08-03 | End: 2022-08-10

## 2022-08-04 ENCOUNTER — TELEPHONE (OUTPATIENT)
Dept: SURGERY | Facility: CLINIC | Age: 62
End: 2022-08-04

## 2022-08-04 NOTE — TELEPHONE ENCOUNTER
Please advise, changing to acute pt has upcoming appt with Ed Ybarra on 08/11/22. Your appointments     Date & Time Appointment Department [facility])    Aug 11, 2022  1:30 PM CDT New Patient Visit with Shyann Bledsoe, [address], [place], [employer]    Please arrive 15 minutes prior to your scheduled appointment. Please also bring your Insurance card, Photo ID, and your medication bottles or a list of your current medication. If your condition improves and this appointment is no longer needed, please contact your physician office to cancel.     Please verify with your primary care provider if your insurance requires a referral.          [facility], [place], [address]  [facility]  [phone]

## 2022-08-04 NOTE — TELEPHONE ENCOUNTER
Pt called stating pt went to immediate care yesterday 8-3-22 for a kidney stone. Pt has not passed the stone and is having pain. Pt declined first available appointment. Pt wants to be seen sooner.   Call

## 2022-08-05 NOTE — TELEPHONE ENCOUNTER
Deana Hussein 7 minutes ago (8:07 AM)      Herbert Boudreaux MD  You; Landry Dee MD; Vero Kent MD; [facility] Urology Clinical Staff 14 hours ago (5:23 PM)     I don't seem to have any earlier opening. If I finish surgery early tomorrow I'll let you know and maybe I can see her in the afternoon. If pain becomes severe again or she has fevers, vomiting and not able to keep food down then she should go back to the ER and whoever is on call (likely me) will take care of her if she requires intervention. Message text      I tried to reach pt and had to [place].

## 2022-08-05 NOTE — TELEPHONE ENCOUNTER
[facility][phone][facility] in [facility]. -Outcome pending.

## 2022-08-09 ENCOUNTER — TELEPHONE (OUTPATIENT)
Dept: SURGERY | Facility: CLINIC | Age: 62
End: 2022-08-09

## 2022-08-09 NOTE — TELEPHONE ENCOUNTER
AUBREYTCMARLEE on VM. I explained we need to reschedule her OV on 8/11/22 d/t provider illness. -Given Call-Center number [phone].  -Outcome pending.

## 2022-10-25 ENCOUNTER — IMMUNIZATION (OUTPATIENT)
Age: 62
End: 2022-10-25
Attending: PREVENTIVE MEDICINE

## 2022-10-25 DIAGNOSIS — Z23 NEED FOR VACCINATION: Primary | ICD-10-CM

## 2022-10-25 PROCEDURE — 90471 IMMUNIZATION ADMIN: CPT | Performed by: NURSE PRACTITIONER

## 2022-11-01 ENCOUNTER — OFFICE VISIT (OUTPATIENT)
Dept: SURGERY | Facility: CLINIC | Age: 62
End: 2022-11-01
Payer: COMMERCIAL

## 2022-11-01 VITALS — DIASTOLIC BLOOD PRESSURE: 73 MMHG | HEART RATE: 70 BPM | SYSTOLIC BLOOD PRESSURE: 131 MMHG

## 2022-11-01 DIAGNOSIS — N20.0 KIDNEY STONES: Primary | ICD-10-CM

## 2022-11-01 PROCEDURE — 3078F DIAST BP <80 MM HG: CPT | Performed by: NURSE PRACTITIONER

## 2022-11-01 PROCEDURE — 99244 OFF/OP CNSLTJ NEW/EST MOD 40: CPT | Performed by: NURSE PRACTITIONER

## 2022-11-01 PROCEDURE — 3075F SYST BP GE 130 - 139MM HG: CPT | Performed by: NURSE PRACTITIONER

## 2022-11-22 ENCOUNTER — HOSPITAL ENCOUNTER (OUTPATIENT)
Dept: MAMMOGRAPHY | Age: 62
Discharge: HOME | End: 2022-11-22
Attending: INTERNAL MEDICINE
Payer: COMMERCIAL

## 2022-11-22 DIAGNOSIS — Z12.31 BREAST CANCER SCREENING BY MAMMOGRAM: ICD-10-CM

## 2022-11-22 PROCEDURE — 77063 BREAST TOMOSYNTHESIS BI: CPT | Performed by: INTERNAL MEDICINE

## 2022-11-22 PROCEDURE — 77067 SCR MAMMO BI INCL CAD: CPT | Performed by: INTERNAL MEDICINE

## 2022-11-23 ENCOUNTER — HOSPITAL ENCOUNTER (OUTPATIENT)
Dept: ULTRASOUND IMAGING | Age: 62
Discharge: HOME | End: 2022-11-23
Attending: NURSE PRACTITIONER
Payer: COMMERCIAL

## 2022-11-23 DIAGNOSIS — N20.0 KIDNEY STONES: ICD-10-CM

## 2022-11-23 PROCEDURE — 76775 US EXAM ABDO BACK WALL LIM: CPT | Performed by: NURSE PRACTITIONER

## 2022-11-25 ENCOUNTER — LAB ENCOUNTER (OUTPATIENT)
Dept: LAB | Facility: HOSPITAL | Age: 62
End: 2022-11-25
Attending: INTERNAL MEDICINE
Payer: COMMERCIAL

## 2022-11-25 DIAGNOSIS — Z00.00 PHYSICAL EXAM, ANNUAL: ICD-10-CM

## 2022-11-25 DIAGNOSIS — N20.0 KIDNEY STONES: ICD-10-CM

## 2022-11-25 LAB
ALBUMIN SERPL-MCNC: 3.8 G/DL (ref 3.4–5)
ALBUMIN/GLOB SERPL: 1.4 {RATIO} (ref 1–2)
ALP LIVER SERPL-CCNC: 101 U/L
ALT SERPL-CCNC: 25 U/L
ANION GAP SERPL CALC-SCNC: 6 MMOL/L (ref 0–18)
AST SERPL-CCNC: 18 U/L (ref 15–37)
BASOPHILS # BLD AUTO: 0.05 X10(3) UL (ref 0–0.2)
BASOPHILS NFR BLD AUTO: 1.2 %
BILIRUB SERPL-MCNC: 0.4 MG/DL (ref 0.1–2)
BILIRUB UR QL: NEGATIVE
BUN BLD-MCNC: 19 MG/DL (ref 7–18)
BUN/CREAT SERPL: 17.9 (ref 10–20)
CALCIUM BLD-MCNC: 9 MG/DL (ref 8.5–10.1)
CHLORIDE SERPL-SCNC: 108 MMOL/L (ref 98–112)
CHOLEST SERPL-MCNC: 200 MG/DL (ref ?–200)
CLARITY UR: CLEAR
CO2 SERPL-SCNC: 27 MMOL/L (ref 21–32)
COLOR UR: YELLOW
CREAT BLD-MCNC: 1.06 MG/DL
DEPRECATED RDW RBC AUTO: 40.9 FL (ref 35.1–46.3)
EOSINOPHIL # BLD AUTO: 0.08 X10(3) UL (ref 0–0.7)
EOSINOPHIL NFR BLD AUTO: 1.9 %
ERYTHROCYTE [DISTWIDTH] IN BLOOD BY AUTOMATED COUNT: 11.8 % (ref 11–15)
FASTING PATIENT LIPID ANSWER: YES
FASTING STATUS PATIENT QL REPORTED: YES
GFR SERPLBLD BASED ON 1.73 SQ M-ARVRAT: 59 ML/MIN/1.73M2 (ref 60–?)
GLOBULIN PLAS-MCNC: 2.7 G/DL (ref 2.8–4.4)
GLUCOSE BLD-MCNC: 84 MG/DL (ref 70–99)
GLUCOSE UR-MCNC: NEGATIVE MG/DL
HCT VFR BLD AUTO: 39.7 %
HDLC SERPL-MCNC: 89 MG/DL (ref 40–59)
HGB BLD-MCNC: 12.8 G/DL
HGB UR QL STRIP.AUTO: NEGATIVE
IMM GRANULOCYTES # BLD AUTO: 0.01 X10(3) UL (ref 0–1)
IMM GRANULOCYTES NFR BLD: 0.2 %
KETONES UR-MCNC: NEGATIVE MG/DL
LDLC SERPL CALC-MCNC: 100 MG/DL (ref ?–100)
LYMPHOCYTES # BLD AUTO: 1.9 X10(3) UL (ref 1–4)
LYMPHOCYTES NFR BLD AUTO: 44.8 %
MCH RBC QN AUTO: 30.7 PG (ref 26–34)
MCHC RBC AUTO-ENTMCNC: 32.2 G/DL (ref 31–37)
MCV RBC AUTO: 95.2 FL
MONOCYTES # BLD AUTO: 0.41 X10(3) UL (ref 0.1–1)
MONOCYTES NFR BLD AUTO: 9.7 %
NEUTROPHILS # BLD AUTO: 1.79 X10 (3) UL (ref 1.5–7.7)
NEUTROPHILS # BLD AUTO: 1.79 X10(3) UL (ref 1.5–7.7)
NEUTROPHILS NFR BLD AUTO: 42.2 %
NITRITE UR QL STRIP.AUTO: NEGATIVE
NONHDLC SERPL-MCNC: 111 MG/DL (ref ?–130)
OSMOLALITY SERPL CALC.SUM OF ELEC: 293 MOSM/KG (ref 275–295)
PH UR: 7.5 [PH] (ref 5–8)
PLATELET # BLD AUTO: 242 10(3)UL (ref 150–450)
POTASSIUM SERPL-SCNC: 4.5 MMOL/L (ref 3.5–5.1)
PROT SERPL-MCNC: 6.5 G/DL (ref 6.4–8.2)
PROT UR-MCNC: NEGATIVE MG/DL
PTH-INTACT SERPL-MCNC: 53.5 PG/ML (ref 18.5–88)
RBC # BLD AUTO: 4.17 X10(6)UL
SODIUM SERPL-SCNC: 141 MMOL/L (ref 136–145)
SP GR UR STRIP: 1.01 (ref 1–1.03)
TRIGL SERPL-MCNC: 59 MG/DL (ref 30–149)
TSI SER-ACNC: 2.57 MIU/ML (ref 0.36–3.74)
UROBILINOGEN UR STRIP-ACNC: 0.2
VLDLC SERPL CALC-MCNC: 10 MG/DL (ref 0–30)
WBC # BLD AUTO: 4.2 X10(3) UL (ref 4–11)

## 2022-11-25 PROCEDURE — 85025 COMPLETE CBC W/AUTO DIFF WBC: CPT

## 2022-11-25 PROCEDURE — 87086 URINE CULTURE/COLONY COUNT: CPT

## 2022-11-25 PROCEDURE — 83970 ASSAY OF PARATHORMONE: CPT

## 2022-11-25 PROCEDURE — 80061 LIPID PANEL: CPT

## 2022-11-25 PROCEDURE — 84443 ASSAY THYROID STIM HORMONE: CPT

## 2022-11-25 PROCEDURE — 81015 MICROSCOPIC EXAM OF URINE: CPT

## 2022-11-25 PROCEDURE — 81001 URINALYSIS AUTO W/SCOPE: CPT

## 2022-11-25 PROCEDURE — 80053 COMPREHEN METABOLIC PANEL: CPT

## 2022-11-25 PROCEDURE — 36415 COLL VENOUS BLD VENIPUNCTURE: CPT

## 2022-11-30 ENCOUNTER — MED REC SCAN ONLY (OUTPATIENT)
Dept: INTERNAL MEDICINE CLINIC | Facility: CLINIC | Age: 62
End: 2022-11-30

## 2022-11-30 ENCOUNTER — OFFICE VISIT (OUTPATIENT)
Dept: INTERNAL MEDICINE CLINIC | Facility: CLINIC | Age: 62
End: 2022-11-30
Payer: COMMERCIAL

## 2022-11-30 VITALS
WEIGHT: 139 LBS | OXYGEN SATURATION: 99 % | BODY MASS INDEX: 23.16 KG/M2 | HEIGHT: 65 IN | HEART RATE: 66 BPM | DIASTOLIC BLOOD PRESSURE: 78 MMHG | SYSTOLIC BLOOD PRESSURE: 121 MMHG

## 2022-11-30 DIAGNOSIS — Z00.00 PHYSICAL EXAM, ANNUAL: Primary | ICD-10-CM

## 2022-11-30 DIAGNOSIS — Z01.419 PAP SMEAR, AS PART OF ROUTINE GYNECOLOGICAL EXAMINATION: ICD-10-CM

## 2022-11-30 PROCEDURE — 3074F SYST BP LT 130 MM HG: CPT | Performed by: INTERNAL MEDICINE

## 2022-11-30 PROCEDURE — 3008F BODY MASS INDEX DOCD: CPT | Performed by: INTERNAL MEDICINE

## 2022-11-30 PROCEDURE — 3078F DIAST BP <80 MM HG: CPT | Performed by: INTERNAL MEDICINE

## 2022-11-30 PROCEDURE — 99396 PREV VISIT EST AGE 40-64: CPT | Performed by: INTERNAL MEDICINE

## 2022-11-30 RX ORDER — VITAMIN B COMPLEX
TABLET ORAL
COMMUNITY

## 2022-12-01 LAB — HPV I/H RISK 1 DNA SPEC QL NAA+PROBE: NEGATIVE

## 2022-12-07 DIAGNOSIS — N20.0 KIDNEY STONES: Primary | ICD-10-CM

## 2022-12-21 ENCOUNTER — LAB ENCOUNTER (OUTPATIENT)
Dept: LAB | Age: 62
End: 2022-12-21
Attending: NURSE PRACTITIONER
Payer: COMMERCIAL

## 2022-12-21 DIAGNOSIS — N20.0 KIDNEY STONES: ICD-10-CM

## 2022-12-21 PROCEDURE — 82507 ASSAY OF CITRATE: CPT

## 2022-12-21 PROCEDURE — 84105 ASSAY OF URINE PHOSPHORUS: CPT

## 2022-12-21 PROCEDURE — 84300 ASSAY OF URINE SODIUM: CPT

## 2022-12-21 PROCEDURE — 83945 ASSAY OF OXALATE: CPT

## 2022-12-21 PROCEDURE — 82436 ASSAY OF URINE CHLORIDE: CPT

## 2022-12-21 PROCEDURE — 84133 ASSAY OF URINE POTASSIUM: CPT

## 2022-12-21 PROCEDURE — 83986 ASSAY PH BODY FLUID NOS: CPT

## 2022-12-21 PROCEDURE — 83735 ASSAY OF MAGNESIUM: CPT

## 2022-12-21 PROCEDURE — 84560 ASSAY OF URINE/URIC ACID: CPT

## 2022-12-21 PROCEDURE — 82340 ASSAY OF CALCIUM IN URINE: CPT

## 2022-12-27 ENCOUNTER — PATIENT MESSAGE (OUTPATIENT)
Dept: SURGERY | Facility: CLINIC | Age: 62
End: 2022-12-27

## 2022-12-27 LAB
CALCIUM URINE - PER 24H: 174 MG/D
CALCIUM URINE - PER VOL: 8 MG/DL
CHLORIDE URINE - PER 24H: 146 MMOL/D
CHLORIDE URINE - PER VOL.: 67 MMOL/L
CITRIC ACID, URINE - MG/DAY: 46 MG/D
CITRIC ACID, URINE - MG/L: 21 MG/L
CREATININE, URINE - PER 24H: 1112 MG/D
CREATININE, URINE - PER VOLUME: 51 MG/DL
HOURS COLLECTED: 24 HR
MAGNESIUM URINE - PER 24H: 83 MG/D
MAGNESIUM URINE - PER VOL: 3.8 MG/DL
OXALATE, URINE - MG/DAY: 24 MG/D
OXALATE, URINE - MG/L: 11 MG/L
PH, URINE: 7.02
PHOSPHORUS URINE - PER 24H: 741 MG/D
PHOSPHORUS URINE - PER VOL: 34 MG/DL
POTASSIUM URINE - PER 24H: 72 MMOL/D
POTASSIUM URINE - PER VOL.: 33 MMOL/L
SODIUM URINE - PER VOL.: 77 MMOL/L
SODIUM URINE -PER 24H: 168 MMOL/D
TOTAL VOLUME: 2180 ML
URIC ACID URINE - PER 24H: 429 MG/D
URIC ACID URINE - PER VOL: 19.7 MG/DL

## 2022-12-27 NOTE — TELEPHONE ENCOUNTER
From: Shanika  To: Corinna Will. HEIDY Love  Sent: 12/27/2022 1:50 PM CST  Subject: Charly Sandy to water    Hi Anila Mcnair your Liliana was good! You recommend adding lime or lemon to water but I can't drink water with lemon or lime in it. I just like plain water. Any other suggestions? Have a great day!     Deana shortness of breath

## 2023-06-21 ENCOUNTER — APPOINTMENT (OUTPATIENT)
Dept: URBAN - METROPOLITAN AREA CLINIC 244 | Age: 63
Setting detail: DERMATOLOGY
End: 2023-06-21

## 2023-06-21 DIAGNOSIS — L81.4 OTHER MELANIN HYPERPIGMENTATION: ICD-10-CM

## 2023-06-21 DIAGNOSIS — L82.1 OTHER SEBORRHEIC KERATOSIS: ICD-10-CM

## 2023-06-21 DIAGNOSIS — Z85.828 PERSONAL HISTORY OF OTHER MALIGNANT NEOPLASM OF SKIN: ICD-10-CM

## 2023-06-21 DIAGNOSIS — D22 MELANOCYTIC NEVI: ICD-10-CM

## 2023-06-21 PROBLEM — D22.5 MELANOCYTIC NEVI OF TRUNK: Status: ACTIVE | Noted: 2023-06-21

## 2023-06-21 PROCEDURE — OTHER COUNSELING: OTHER

## 2023-06-21 PROCEDURE — 99213 OFFICE O/P EST LOW 20 MIN: CPT

## 2023-06-21 PROCEDURE — OTHER DEFER: OTHER

## 2023-06-21 ASSESSMENT — LOCATION DETAILED DESCRIPTION DERM
LOCATION DETAILED: RIGHT SUPERIOR MEDIAL UPPER BACK
LOCATION DETAILED: LEFT MEDIAL UPPER BACK
LOCATION DETAILED: UPPER STERNUM
LOCATION DETAILED: LEFT SUPERIOR MEDIAL MALAR CHEEK

## 2023-06-21 ASSESSMENT — LOCATION ZONE DERM
LOCATION ZONE: FACE
LOCATION ZONE: TRUNK

## 2023-06-21 ASSESSMENT — LOCATION SIMPLE DESCRIPTION DERM
LOCATION SIMPLE: LEFT CHEEK
LOCATION SIMPLE: LEFT UPPER BACK
LOCATION SIMPLE: RIGHT UPPER BACK
LOCATION SIMPLE: CHEST

## 2023-06-21 NOTE — PROCEDURE: DEFER
Size Of Lesion In Cm (Optional): 0
Body Location Override (Optional - Billing Will Still Be Based On Selected Body Map Location If Applicable): Left Lower Eyelid
Detail Level: Detailed
Introduction Text (Please End With A Colon): ;

## 2023-12-09 ENCOUNTER — LAB ENCOUNTER (OUTPATIENT)
Dept: LAB | Facility: HOSPITAL | Age: 63
End: 2023-12-09
Attending: INTERNAL MEDICINE
Payer: COMMERCIAL

## 2023-12-09 DIAGNOSIS — Z00.00 PHYSICAL EXAM, ANNUAL: ICD-10-CM

## 2023-12-09 DIAGNOSIS — Z13.1 SCREENING FOR DIABETES MELLITUS: ICD-10-CM

## 2023-12-09 LAB
ALBUMIN SERPL-MCNC: 4.4 G/DL (ref 3.2–4.8)
ALBUMIN/GLOB SERPL: 1.9 {RATIO} (ref 1–2)
ALP LIVER SERPL-CCNC: 94 U/L
ALT SERPL-CCNC: 14 U/L
ANION GAP SERPL CALC-SCNC: 7 MMOL/L (ref 0–18)
AST SERPL-CCNC: 19 U/L (ref ?–34)
BASOPHILS # BLD AUTO: 0.04 X10(3) UL (ref 0–0.2)
BASOPHILS NFR BLD AUTO: 0.9 %
BILIRUB SERPL-MCNC: 0.8 MG/DL (ref 0.2–1.1)
BUN BLD-MCNC: 19 MG/DL (ref 9–23)
BUN/CREAT SERPL: 19.2 (ref 10–20)
CALCIUM BLD-MCNC: 9.7 MG/DL (ref 8.7–10.4)
CHLORIDE SERPL-SCNC: 108 MMOL/L (ref 98–112)
CHOLEST SERPL-MCNC: 216 MG/DL (ref ?–200)
CO2 SERPL-SCNC: 26 MMOL/L (ref 21–32)
CREAT BLD-MCNC: 0.99 MG/DL
DEPRECATED RDW RBC AUTO: 40.7 FL (ref 35.1–46.3)
EGFRCR SERPLBLD CKD-EPI 2021: 64 ML/MIN/1.73M2 (ref 60–?)
EOSINOPHIL # BLD AUTO: 0.08 X10(3) UL (ref 0–0.7)
EOSINOPHIL NFR BLD AUTO: 1.8 %
ERYTHROCYTE [DISTWIDTH] IN BLOOD BY AUTOMATED COUNT: 11.9 % (ref 11–15)
EST. AVERAGE GLUCOSE BLD GHB EST-MCNC: 126 MG/DL (ref 68–126)
FASTING PATIENT LIPID ANSWER: YES
FASTING STATUS PATIENT QL REPORTED: YES
GLOBULIN PLAS-MCNC: 2.3 G/DL (ref 2.8–4.4)
GLUCOSE BLD-MCNC: 88 MG/DL (ref 70–99)
HBA1C MFR BLD: 6 % (ref ?–5.7)
HCT VFR BLD AUTO: 39.5 %
HDLC SERPL-MCNC: 80 MG/DL (ref 40–59)
HGB BLD-MCNC: 13 G/DL
IMM GRANULOCYTES # BLD AUTO: 0 X10(3) UL (ref 0–1)
IMM GRANULOCYTES NFR BLD: 0 %
LDLC SERPL CALC-MCNC: 125 MG/DL (ref ?–100)
LYMPHOCYTES # BLD AUTO: 1.9 X10(3) UL (ref 1–4)
LYMPHOCYTES NFR BLD AUTO: 43.6 %
MCH RBC QN AUTO: 30.7 PG (ref 26–34)
MCHC RBC AUTO-ENTMCNC: 32.9 G/DL (ref 31–37)
MCV RBC AUTO: 93.2 FL
MONOCYTES # BLD AUTO: 0.37 X10(3) UL (ref 0.1–1)
MONOCYTES NFR BLD AUTO: 8.5 %
NEUTROPHILS # BLD AUTO: 1.97 X10 (3) UL (ref 1.5–7.7)
NEUTROPHILS # BLD AUTO: 1.97 X10(3) UL (ref 1.5–7.7)
NEUTROPHILS NFR BLD AUTO: 45.2 %
NONHDLC SERPL-MCNC: 136 MG/DL (ref ?–130)
OSMOLALITY SERPL CALC.SUM OF ELEC: 294 MOSM/KG (ref 275–295)
PLATELET # BLD AUTO: 263 10(3)UL (ref 150–450)
POTASSIUM SERPL-SCNC: 3.9 MMOL/L (ref 3.5–5.1)
PROT SERPL-MCNC: 6.7 G/DL (ref 5.7–8.2)
RBC # BLD AUTO: 4.24 X10(6)UL
SODIUM SERPL-SCNC: 141 MMOL/L (ref 136–145)
TRIGL SERPL-MCNC: 63 MG/DL (ref 30–149)
TSI SER-ACNC: 4.39 MIU/ML (ref 0.55–4.78)
VLDLC SERPL CALC-MCNC: 11 MG/DL (ref 0–30)
WBC # BLD AUTO: 4.4 X10(3) UL (ref 4–11)

## 2023-12-09 PROCEDURE — 80053 COMPREHEN METABOLIC PANEL: CPT

## 2023-12-09 PROCEDURE — 83036 HEMOGLOBIN GLYCOSYLATED A1C: CPT

## 2023-12-09 PROCEDURE — 85025 COMPLETE CBC W/AUTO DIFF WBC: CPT

## 2023-12-09 PROCEDURE — 36415 COLL VENOUS BLD VENIPUNCTURE: CPT

## 2023-12-09 PROCEDURE — 84443 ASSAY THYROID STIM HORMONE: CPT

## 2023-12-09 PROCEDURE — 80061 LIPID PANEL: CPT

## 2023-12-12 ENCOUNTER — HOSPITAL ENCOUNTER (OUTPATIENT)
Dept: MAMMOGRAPHY | Age: 63
Discharge: HOME | End: 2023-12-12
Attending: INTERNAL MEDICINE
Payer: COMMERCIAL

## 2023-12-12 DIAGNOSIS — Z12.31 BREAST CANCER SCREENING BY MAMMOGRAM: ICD-10-CM

## 2023-12-12 PROCEDURE — 77063 BREAST TOMOSYNTHESIS BI: CPT | Performed by: INTERNAL MEDICINE

## 2023-12-12 PROCEDURE — 77067 SCR MAMMO BI INCL CAD: CPT | Performed by: INTERNAL MEDICINE

## 2023-12-21 ENCOUNTER — HOSPITAL ENCOUNTER (OUTPATIENT)
Dept: MRI IMAGING | Age: 63
Discharge: HOME | End: 2023-12-21
Attending: INTERNAL MEDICINE
Payer: COMMERCIAL

## 2023-12-21 DIAGNOSIS — Z86.011 HISTORY OF MENINGIOMA OF THE BRAIN: ICD-10-CM

## 2023-12-21 PROCEDURE — 70553 MRI BRAIN STEM W/O & W/DYE: CPT | Performed by: INTERNAL MEDICINE

## 2023-12-21 PROCEDURE — A9575 INJ GADOTERATE MEGLUMI 0.1ML: HCPCS | Performed by: INTERNAL MEDICINE

## 2023-12-21 RX ORDER — GADOTERATE MEGLUMINE 376.9 MG/ML
18 INJECTION INTRAVENOUS
Status: COMPLETED | OUTPATIENT
Start: 2023-12-21 | End: 2023-12-21

## 2023-12-21 RX ADMIN — GADOTERATE MEGLUMINE 18 ML: 376.9 INJECTION INTRAVENOUS at 08:15:00

## 2024-02-10 ENCOUNTER — APPOINTMENT (OUTPATIENT)
Dept: GENERAL RADIOLOGY | Age: 64
End: 2024-02-10
Attending: NURSE PRACTITIONER
Payer: COMMERCIAL

## 2024-02-10 ENCOUNTER — HOSPITAL ENCOUNTER (OUTPATIENT)
Age: 64
Discharge: HOME | End: 2024-02-10
Payer: COMMERCIAL

## 2024-02-10 VITALS
OXYGEN SATURATION: 100 % | DIASTOLIC BLOOD PRESSURE: 70 MMHG | RESPIRATION RATE: 16 BRPM | TEMPERATURE: 98 F | SYSTOLIC BLOOD PRESSURE: 120 MMHG | HEART RATE: 72 BPM

## 2024-02-10 DIAGNOSIS — M25.579 ANKLE PAIN: ICD-10-CM

## 2024-02-10 DIAGNOSIS — S93.601A SPRAIN OF RIGHT FOOT, INITIAL ENCOUNTER: ICD-10-CM

## 2024-02-10 DIAGNOSIS — S93.401A MILD SPRAIN OF RIGHT ANKLE, INITIAL ENCOUNTER: ICD-10-CM

## 2024-02-10 DIAGNOSIS — M79.673 FOOT PAIN: Primary | ICD-10-CM

## 2024-02-10 PROCEDURE — 73610 X-RAY EXAM OF ANKLE: CPT | Performed by: NURSE PRACTITIONER

## 2024-02-10 PROCEDURE — 99213 OFFICE O/P EST LOW 20 MIN: CPT | Performed by: NURSE PRACTITIONER

## 2024-02-10 PROCEDURE — 73630 X-RAY EXAM OF FOOT: CPT | Performed by: NURSE PRACTITIONER

## 2024-02-10 NOTE — ED PROVIDER NOTES
Patient Seen in: [facility]      History     Chief Complaint   Patient presents with    Ankle Pain     Stated Complaint:     Subjective:   HPI    63-year-old female presents to immediate care with complaints of right ankle pain x 4 days.  She states the pain started after running.    Objective:   Past Medical History:   Diagnosis Date    Arm mass 1997    per NG Rt arm    Basal cell carcinoma of face 2014    per NG left face; excision    Brain tumor (HCC)     per NG surgery at [facility]-2003    Hernia of unspecified site of abdominal cavity without mention of obstruction or gangrene 1995    per NG hernia    Internal hemorrhoids without complication 3/7/2019    Raynaud's disease               Past Surgical History:   Procedure Laterality Date    COLONOSCOPY N/A 3/7/2019    Procedure: COLONOSCOPY;  Surgeon: Angela Marx MD;  Location: [facility] ENDOSCOPY    EXCIS PRIMARY GANGLION WRIST Right 2012    per NG [facility] wrist ganglion removal    HERNIA SURGERY  1995    per NG repair    NEEDLE BIOPSY LEFT  09/2019    us guided bx    OTHER SURGICAL HISTORY Right 1997    per NG benign removed (rt arm)    REPAIR ROTATOR CUFF,ACUTE      TUBAL LIGATION      UPPER ARM/ELBOW SURGERY UNLISTED  2010    per NG shoulder surgery                Social History     Socioeconomic History    Marital status:    Tobacco Use    Smoking status: Never    Smokeless tobacco: Never   Vaping Use    Vaping Use: Never used   Substance and Sexual Activity    Alcohol use: No    Drug use: No    Sexual activity: Yes     Partners: Male   Other Topics Concern    Caffeine Concern Yes     Comment: per NG 1 cup of coffee daily              Review of Systems    Positive for stated complaint:   Other systems are as noted in HPI.  Constitutional and vital signs reviewed.      All other systems reviewed and negative except as noted above.    Physical Exam     ED Triage Vitals [02/10/24 0804]   /70   Pulse 72   Resp 16   Temp 98.2 °F  (36.8 °C)   Temp src Oral   SpO2 100 %   O2 Device None (Room air)       Current:/70   Pulse 72   Temp 98.2 °F (36.8 °C) (Oral)   Resp 16   LMP 09/17/2010   SpO2 100%         Physical Exam  Vitals reviewed.   Constitutional:       Appearance: Normal appearance.   Cardiovascular:      Rate and Rhythm: Normal rate and regular rhythm.   Pulmonary:      Effort: Pulmonary effort is normal.      Breath sounds: Normal breath sounds.   Musculoskeletal:         General: Swelling and tenderness present. No deformity or signs of injury.      Comments: Positive tenderness and mild swelling bilateral malleolus right ankle.  There is no deformity.  Pulses are intact.   Neurological:      General: No focal deficit present.      Mental Status: She is alert and oriented to person, place, and time.   Psychiatric:         Mood and Affect: Mood normal.         Behavior: Behavior normal.               ED Course   Labs Reviewed - No data to display          XR ANKLE (MIN 3 VIEWS), RIGHT (CPT=73610)          PROCEDURE: XR ANKLE (MIN 3 VIEWS), RIGHT (CPT=73610)     COMPARISON: None.     INDICATIONS: Pain and swelling along medial and lateral aspects of the right ankle for 4-5 days. No known injury.     TECHNIQUE: 3 views were obtained.       FINDINGS:   BONES: No acute fracture or osseous malalignment.  Joint spaces are maintained.  Small plantar calcaneal enthesophyte.  SOFT TISSUES: Negative. No visible soft tissue swelling.   EFFUSION: None visible.   OTHER: Negative.               =====  CONCLUSION:   No acute fracture or dislocation.           Dictated by (CST): Lachelle Pearce MD on 2/10/2024 at 8:30 AM       Finalized by (CST): Lachelle Pearce MD on 2/10/2024 at 8:31 AM                 XR FOOT, COMPLETE (MIN 3 VIEWS), RIGHT (CPT=73630)          PROCEDURE: XR FOOT, COMPLETE (MIN 3 VIEWS), RIGHT (CPT=73630)     COMPARISON: [facility], X FOOT RT, 7/26/2006, 6:25 PM.     INDICATIONS: Pain and swelling along  medial and lateral aspects of right proximal foot for 4-5 days. No known injury.     TECHNIQUE: 4. views were obtained.       FINDINGS:   BONES: There is diffuse osseous demineralization, which limits sensitivity for detection of osseous pathology.  No acute fracture or osseous malalignment.  There is mild joint space narrowing with tiny osteophyte formation of the first   metatarsophalangeal joint.  There is suggestion of mild hallux valgus on nonweightbearing radiographs.  SOFT TISSUES: Negative. No visible soft tissue swelling.   EFFUSION: None visible.   OTHER: Negative.               =====  CONCLUSION:      No acute fracture.     Suggestion of mild hallux valgus.     Mild first metatarsophalangeal joint osteoarthritis.           Dictated by (CST): Lachelle Pearce MD on 2/10/2024 at 8:31 AM       Finalized by (CST): Lachelle Pearce MD on 2/10/2024 at 8:33 AM                          [facility]                                         Medical Decision Making  Patient complains of right ankle and foot pain that started 4 days ago after she began running.  Differential diagnosis includes ankle fracture, foot fracture, ankle sprain, foot sprain.  X-rays of right foot and right ankle show no fracture.  Patient was given instructions for ankle and foot sprain.    Amount and/or Complexity of Data Reviewed  Radiology: ordered.     Details: Xrays R foot and R ankle  reviewed by IC provider.  Neg fracture    Risk  OTC drugs.        Disposition and Plan     Clinical Impression:  1. Foot pain    2. Ankle pain    3. Mild sprain of right ankle, initial encounter    4. Sprain of right foot, initial encounter         Disposition:  Discharge  2/10/2024  8:42 am    Follow-up:  Zandra Powell MD  [address] [place] [id number] [phone]      If symptoms worsen          Medications Prescribed:  Discharge Medication List as of 2/10/2024  8:44 AM

## 2024-05-22 ENCOUNTER — LAB ENCOUNTER (OUTPATIENT)
Dept: LAB | Age: 64
End: 2024-05-22
Attending: INTERNAL MEDICINE

## 2024-05-22 ENCOUNTER — OFFICE VISIT (OUTPATIENT)
Dept: INTERNAL MEDICINE CLINIC | Facility: CLINIC | Age: 64
End: 2024-05-22

## 2024-05-22 VITALS
HEART RATE: 73 BPM | SYSTOLIC BLOOD PRESSURE: 134 MMHG | BODY MASS INDEX: 22.93 KG/M2 | HEIGHT: 65 IN | DIASTOLIC BLOOD PRESSURE: 67 MMHG | WEIGHT: 137.63 LBS | RESPIRATION RATE: 17 BRPM

## 2024-05-22 DIAGNOSIS — R73.9 HYPERGLYCEMIA: ICD-10-CM

## 2024-05-22 DIAGNOSIS — R89.9 ABNORMAL LABORATORY TEST: ICD-10-CM

## 2024-05-22 DIAGNOSIS — Z00.00 PHYSICAL EXAM, ANNUAL: Primary | ICD-10-CM

## 2024-05-22 LAB
ANION GAP SERPL CALC-SCNC: 4 MMOL/L (ref 0–18)
BUN BLD-MCNC: 19 MG/DL (ref 9–23)
BUN/CREAT SERPL: 19.2 (ref 10–20)
CALCIUM BLD-MCNC: 10 MG/DL (ref 8.7–10.4)
CHLORIDE SERPL-SCNC: 111 MMOL/L (ref 98–112)
CO2 SERPL-SCNC: 27 MMOL/L (ref 21–32)
CREAT BLD-MCNC: 0.99 MG/DL
EGFRCR SERPLBLD CKD-EPI 2021: 64 ML/MIN/1.73M2 (ref 60–?)
EST. AVERAGE GLUCOSE BLD GHB EST-MCNC: 126 MG/DL (ref 68–126)
FASTING STATUS PATIENT QL REPORTED: NO
GLUCOSE BLD-MCNC: 84 MG/DL (ref 70–99)
HBA1C MFR BLD: 6 % (ref ?–5.7)
OSMOLALITY SERPL CALC.SUM OF ELEC: 295 MOSM/KG (ref 275–295)
POTASSIUM SERPL-SCNC: 4.3 MMOL/L (ref 3.5–5.1)
SODIUM SERPL-SCNC: 142 MMOL/L (ref 136–145)
TSI SER-ACNC: 3.49 MIU/ML (ref 0.55–4.78)

## 2024-05-22 PROCEDURE — 36415 COLL VENOUS BLD VENIPUNCTURE: CPT

## 2024-05-22 PROCEDURE — 84443 ASSAY THYROID STIM HORMONE: CPT

## 2024-05-22 PROCEDURE — 80048 BASIC METABOLIC PNL TOTAL CA: CPT

## 2024-05-22 PROCEDURE — 83036 HEMOGLOBIN GLYCOSYLATED A1C: CPT

## 2024-05-22 PROCEDURE — 99396 PREV VISIT EST AGE 40-64: CPT | Performed by: INTERNAL MEDICINE

## 2024-05-26 NOTE — PROGRESS NOTES
Subjective:     Patient ID: Urszula Cook is a 63 year old female.  Presents for physical exam  HPI  Patient reports that she has been feeling well, had very busy winter, tries to eat healthy walks regularly.  Recent hemoglobin A1c was in prediabetic range, MRI of the brain showed no significant changes she has history of meningioma meningioma resection  Review of Systems       Constitutional:  Negative for decreased activity, fever, irritability and lethargy  Cardiovascular:  Negative for chest pain and irregular heartbeat/palpitations  Respiratory:  Negative for cough, dyspnea and wheezing.  Eyes:  Negative for eye discharge and vision loss  Endocrine:  Negative for polydipsia and polyphagia  Integumentary:  Negative for pruritus and rash  Neurological:  Negative for gait disturbance, paresthesias.   Psychiatric:  Negative for inappropriate interaction and psychiatric symptoms  Current Outpatient Medications   Medication Sig Dispense Refill    Cholecalciferol (VITAMIN D) 25 MCG (1000 UT) Oral Tab       Calcium 500 MG Oral Chew Tab Chew 2 each by mouth daily.      Probiotic Product (PROBIOTIC-10) Oral Cap Take by mouth.       Allergies:No Known Allergies    Past Medical History:    Arm mass    per NG Rt arm    Basal cell carcinoma of face    per NG left face; excision    Brain tumor (HCC)    per  surgery at [facility]-2003    Hernia of unspecified site of abdominal cavity without mention of obstruction or gangrene    per NG hernia    Internal hemorrhoids without complication    Raynaud's disease      Past Surgical History:   Procedure Laterality Date    Colonoscopy N/A 3/7/2019    Procedure: COLONOSCOPY;  Surgeon: Angela Marx MD;  Location: [facility] ENDOSCOPY    Excis primary ganglion wrist Right 2012    per NG Rig wrist ganglion removal    Hernia surgery  1995    per NG repair    Needle biopsy left  09/2019    us guided bx    Other surgical history Right 1997    per NG benign removed (rt arm)     Repair rotator cuff,acute      Tubal ligation      Upper arm/elbow surgery unlisted  2010    per NG shoulder surgery      Family History   Problem Relation Age of Onset    Diabetes Father     Arthritis Father         per NG osteoarthritis    Ovarian Cancer Sister 58    Basal Cell Self       Social History:   Social History     Socioeconomic History    Marital status:    Tobacco Use    Smoking status: Never    Smokeless tobacco: Never   Vaping Use    Vaping status: Never Used   Substance and Sexual Activity    Alcohol use: No    Drug use: No    Sexual activity: Yes     Partners: Male   Other Topics Concern    Caffeine Concern Yes     Comment: per NG 1 cup of coffee daily        /67   Pulse 73   Resp 17   Ht 5' 5\" (1.651 m)   Wt 137 lb 9.6 oz (62.4 kg)   LMP 09/17/2010   BMI 22.90 kg/m²    Physical Exam  Constitutional:       Appearance: Normal appearance.   HENT:      Head: Normocephalic and atraumatic.   Eyes:      General: No scleral icterus.     Extraocular Movements: Extraocular movements intact.      Conjunctiva/sclera: Conjunctivae normal.      Pupils: Pupils are equal, round, and reactive to light.   Neck:      Vascular: No carotid bruit.   Cardiovascular:      Rate and Rhythm: Normal rate and regular rhythm.      Heart sounds: No murmur heard.  Pulmonary:      Effort: Pulmonary effort is normal. No respiratory distress.      Breath sounds: No wheezing or rhonchi.   Abdominal:      General: Bowel sounds are normal.      Palpations: Abdomen is soft. There is no mass.      Tenderness: There is no abdominal tenderness. There is no guarding or rebound.      Hernia: No hernia is present.   Musculoskeletal:         General: Normal range of motion.      Cervical back: Normal range of motion and neck supple.      Right lower leg: No edema.      Left lower leg: No edema.   Lymphadenopathy:      Cervical: No cervical adenopathy.   Skin:     General: Skin is warm.      Coloration: Skin is not  jaundiced.   Neurological:      General: No focal deficit present.      Mental Status: She is alert and oriented to person, place, and time. Mental status is at baseline.   Psychiatric:         Mood and Affect: Mood normal.         Behavior: Behavior normal.         Thought Content: Thought content normal.         Judgment: Judgment normal.         Assessment & Plan:   1. Physical exam, annual continue healthy diet, regular physical activities   2. Hyperglycemia will check hemoglobin A1c continue low carbohydrate diet, monitor hemoglobin A1c every 6 months   3. Abnormal laboratory test check TSH with reflex       Orders Placed This Encounter   Procedures    Hemoglobin A1C    Basic Metabolic Panel (8)    TSH W Reflex To Free T4     Follow-up in 1 year or sooner if needed  Meds This Visit:    Imaging & Referrals:  None

## 2024-10-15 ENCOUNTER — APPOINTMENT (OUTPATIENT)
Dept: OTHER | Facility: HOSPITAL | Age: 64
End: 2024-10-15
Attending: EMERGENCY MEDICINE

## 2025-03-04 ENCOUNTER — PATIENT MESSAGE (OUTPATIENT)
Dept: INTERNAL MEDICINE CLINIC | Facility: CLINIC | Age: 65
End: 2025-03-04

## 2025-03-04 DIAGNOSIS — Z00.00 PHYSICAL EXAM, ANNUAL: ICD-10-CM

## 2025-03-04 DIAGNOSIS — Z12.31 BREAST CANCER SCREENING BY MAMMOGRAM: Primary | ICD-10-CM

## 2025-03-05 NOTE — TELEPHONE ENCOUNTER
Can you please order labs for upcoming appointment.    Screening mammogram has been pended for approval

## 2025-03-13 ENCOUNTER — LAB ENCOUNTER (OUTPATIENT)
Dept: LAB | Facility: REFERENCE LAB | Age: 65
End: 2025-03-13
Attending: NURSE PRACTITIONER
Payer: COMMERCIAL

## 2025-03-13 DIAGNOSIS — Z00.00 PHYSICAL EXAM, ANNUAL: ICD-10-CM

## 2025-03-13 LAB
ALBUMIN SERPL-MCNC: 4.7 G/DL (ref 3.2–4.8)
ALBUMIN/GLOB SERPL: 2 {RATIO} (ref 1–2)
ALP LIVER SERPL-CCNC: 98 U/L
ALT SERPL-CCNC: 15 U/L
ANION GAP SERPL CALC-SCNC: 8 MMOL/L (ref 0–18)
AST SERPL-CCNC: 23 U/L (ref ?–34)
BASOPHILS # BLD AUTO: 0.05 X10(3) UL (ref 0–0.2)
BASOPHILS NFR BLD AUTO: 0.7 %
BILIRUB SERPL-MCNC: 0.7 MG/DL (ref 0.2–1.1)
BUN BLD-MCNC: 14 MG/DL (ref 9–23)
BUN/CREAT SERPL: 13.9 (ref 10–20)
CALCIUM BLD-MCNC: 9.5 MG/DL (ref 8.7–10.4)
CHLORIDE SERPL-SCNC: 107 MMOL/L (ref 98–112)
CHOLEST SERPL-MCNC: 199 MG/DL (ref ?–200)
CO2 SERPL-SCNC: 25 MMOL/L (ref 21–32)
CREAT BLD-MCNC: 1.01 MG/DL
DEPRECATED RDW RBC AUTO: 38.7 FL (ref 35.1–46.3)
EGFRCR SERPLBLD CKD-EPI 2021: 62 ML/MIN/1.73M2 (ref 60–?)
EOSINOPHIL # BLD AUTO: 0.09 X10(3) UL (ref 0–0.7)
EOSINOPHIL NFR BLD AUTO: 1.3 %
ERYTHROCYTE [DISTWIDTH] IN BLOOD BY AUTOMATED COUNT: 11.5 % (ref 11–15)
FASTING PATIENT LIPID ANSWER: YES
FASTING STATUS PATIENT QL REPORTED: YES
GLOBULIN PLAS-MCNC: 2.3 G/DL (ref 2–3.5)
GLUCOSE BLD-MCNC: 90 MG/DL (ref 70–99)
HCT VFR BLD AUTO: 39.5 %
HDLC SERPL-MCNC: 66 MG/DL (ref 40–59)
HGB BLD-MCNC: 13.2 G/DL
IMM GRANULOCYTES # BLD AUTO: 0.02 X10(3) UL (ref 0–1)
IMM GRANULOCYTES NFR BLD: 0.3 %
LDLC SERPL CALC-MCNC: 115 MG/DL (ref ?–100)
LYMPHOCYTES # BLD AUTO: 1.77 X10(3) UL (ref 1–4)
LYMPHOCYTES NFR BLD AUTO: 26.3 %
MCH RBC QN AUTO: 30.5 PG (ref 26–34)
MCHC RBC AUTO-ENTMCNC: 33.4 G/DL (ref 31–37)
MCV RBC AUTO: 91.2 FL
MONOCYTES # BLD AUTO: 0.53 X10(3) UL (ref 0.1–1)
MONOCYTES NFR BLD AUTO: 7.9 %
NEUTROPHILS # BLD AUTO: 4.27 X10 (3) UL (ref 1.5–7.7)
NEUTROPHILS # BLD AUTO: 4.27 X10(3) UL (ref 1.5–7.7)
NEUTROPHILS NFR BLD AUTO: 63.5 %
NONHDLC SERPL-MCNC: 133 MG/DL (ref ?–130)
OSMOLALITY SERPL CALC.SUM OF ELEC: 290 MOSM/KG (ref 275–295)
PLATELET # BLD AUTO: 266 10(3)UL (ref 150–450)
POTASSIUM SERPL-SCNC: 4.4 MMOL/L (ref 3.5–5.1)
PROT SERPL-MCNC: 7 G/DL (ref 5.7–8.2)
RBC # BLD AUTO: 4.33 X10(6)UL
SODIUM SERPL-SCNC: 140 MMOL/L (ref 136–145)
TRIGL SERPL-MCNC: 102 MG/DL (ref 30–149)
TSI SER-ACNC: 2.74 UIU/ML (ref 0.55–4.78)
VLDLC SERPL CALC-MCNC: 18 MG/DL (ref 0–30)
WBC # BLD AUTO: 6.7 X10(3) UL (ref 4–11)

## 2025-03-13 PROCEDURE — 80061 LIPID PANEL: CPT

## 2025-03-13 PROCEDURE — 36415 COLL VENOUS BLD VENIPUNCTURE: CPT

## 2025-03-13 PROCEDURE — 84443 ASSAY THYROID STIM HORMONE: CPT

## 2025-03-13 PROCEDURE — 85025 COMPLETE CBC W/AUTO DIFF WBC: CPT

## 2025-03-13 PROCEDURE — 80053 COMPREHEN METABOLIC PANEL: CPT

## 2025-03-20 ENCOUNTER — HOSPITAL ENCOUNTER (OUTPATIENT)
Dept: MAMMOGRAPHY | Age: 65
Discharge: HOME | End: 2025-03-20
Attending: NURSE PRACTITIONER
Payer: COMMERCIAL

## 2025-03-20 DIAGNOSIS — Z12.31 BREAST CANCER SCREENING BY MAMMOGRAM: ICD-10-CM

## 2025-03-20 PROCEDURE — 77063 BREAST TOMOSYNTHESIS BI: CPT | Performed by: NURSE PRACTITIONER

## 2025-03-20 PROCEDURE — 77067 SCR MAMMO BI INCL CAD: CPT | Performed by: NURSE PRACTITIONER

## 2025-03-21 ENCOUNTER — OFFICE VISIT (OUTPATIENT)
Dept: INTERNAL MEDICINE CLINIC | Facility: CLINIC | Age: 65
End: 2025-03-21
Payer: COMMERCIAL

## 2025-03-21 VITALS
DIASTOLIC BLOOD PRESSURE: 73 MMHG | HEIGHT: 65 IN | BODY MASS INDEX: 24.32 KG/M2 | SYSTOLIC BLOOD PRESSURE: 112 MMHG | HEART RATE: 65 BPM | OXYGEN SATURATION: 99 % | WEIGHT: 146 LBS

## 2025-03-21 DIAGNOSIS — Z00.00 PHYSICAL EXAM, ANNUAL: Primary | ICD-10-CM

## 2025-03-21 PROCEDURE — 99396 PREV VISIT EST AGE 40-64: CPT | Performed by: INTERNAL MEDICINE

## 2025-03-27 NOTE — PROGRESS NOTES
Subjective:     Patient ID: Urszula Cook is a 64 year old female presents for physical exam    HPI  Patient reports that she has been feeling well, she has no concerns, she is up-to-date with preventative services.  She works as a nurse at [employer]    Review of Systems       Constitutional:  Negative for decreased activity, fever, irritability and lethargy  Cardiovascular:  Negative for chest pain and irregular heartbeat/palpitations  Respiratory:  Negative for cough, dyspnea and wheezing.  Eyes:  Negative for eye discharge and vision loss  Endocrine:  Negative for polydipsia and polyphagia  Integumentary:  Negative for pruritus and rash  Neurological:  Negative for gait disturbance, paresthesias.   Psychiatric:  Negative for inappropriate interaction and psychiatric symptoms  Current Outpatient Medications   Medication Sig Dispense Refill    Cholecalciferol (VITAMIN D) 25 MCG (1000 UT) Oral Tab       Calcium 500 MG Oral Chew Tab Chew 2 each by mouth daily.      Probiotic Product (PROBIOTIC-10) Oral Cap Take by mouth.       Allergies:Allergies[1]    Past Medical History:    Arm mass    per NG Rt arm    Basal cell carcinoma of face    per NG left face; excision    Brain tumor (HCC)    per  surgery at [facility]-    Calculus of kidney    Cancer (HCC)    Hernia of unspecified site of abdominal cavity without mention of obstruction or gangrene    per NG hernia    Internal hemorrhoids without complication    Osteoarthritis    Raynaud's disease      Past Surgical History:   Procedure Laterality Date    Colonoscopy N/A 2019    Procedure: COLONOSCOPY;  Surgeon: Angela Marx MD;  Location: [facility] ENDOSCOPY    Excis primary ganglion wrist Right     per NG Rig wrist ganglion removal    Hernia surgery      per NG repair    Needle biopsy left  2019    us guided bx      94, 97    Other surgical history Right     per NG benign removed (rt arm)    Repair rotator  cuff,acute      Skin surgery      Skin cancer    Tubal ligation      Upper arm/elbow surgery unlisted  2010    per NG shoulder surgery      Family History   Problem Relation Age of Onset    Basal Cell Self     Diabetes Father     Arthritis Father         per NG osteoarthritis    Hypertension Father     Musculo-skelatal Disorder Father     Ovarian Cancer Sister 58    Cancer Sister     Breast Cancer Neg     Prostate Cancer Neg     Pancreatic Cancer Neg       Social History:   Social History     Socioeconomic History    Marital status:    Tobacco Use    Smoking status: Never    Smokeless tobacco: Never   Vaping Use    Vaping status: Never Used   Substance and Sexual Activity    Alcohol use: Never    Drug use: Never    Sexual activity: Yes     Partners: Male   Other Topics Concern    Caffeine Concern Yes     Comment: per NG 1 cup of coffee daily        /73 (BP Location: Left arm, Patient Position: Sitting, Cuff Size: adult)   Pulse 65   Ht 5' 5\" (1.651 m)   Wt 146 lb (66.2 kg)   LMP 09/17/2010   SpO2 99%   BMI 24.30 kg/m²    Physical Exam  Constitutional:       General: She is not in acute distress.     Appearance: Normal appearance.   HENT:      Head: Normocephalic and atraumatic.      Right Ear: Tympanic membrane, ear canal and external ear normal.      Left Ear: Tympanic membrane, ear canal and external ear normal.   Eyes:      General: No scleral icterus.     Extraocular Movements: Extraocular movements intact.      Conjunctiva/sclera: Conjunctivae normal.      Pupils: Pupils are equal, round, and reactive to light.   Cardiovascular:      Rate and Rhythm: Normal rate and regular rhythm.      Heart sounds: No murmur heard.     No gallop.   Pulmonary:      Effort: Pulmonary effort is normal. No respiratory distress.      Breath sounds: No wheezing or rhonchi.   Abdominal:      General: Abdomen is flat.      Palpations: Abdomen is soft. There is no mass.      Tenderness: There is no abdominal  tenderness. There is no guarding or rebound.   Musculoskeletal:         General: Normal range of motion.      Cervical back: Normal range of motion and neck supple.      Right lower leg: No edema.      Left lower leg: No edema.   Skin:     General: Skin is warm.      Coloration: Skin is not jaundiced.   Neurological:      General: No focal deficit present.      Mental Status: She is alert and oriented to person, place, and time. Mental status is at baseline.   Psychiatric:         Mood and Affect: Mood normal.         Behavior: Behavior normal.         Thought Content: Thought content normal.         Assessment & Plan:   1. Physical exam, annual      Patient will continue healthy diet, regular physical activities presents for follow up in 1 year      Meds This Visit:  Requested Prescriptions      No prescriptions requested or ordered in this encounter       Imaging & Referrals:  None        [1] No Known Allergies

## 2025-03-31 ENCOUNTER — APPOINTMENT (OUTPATIENT)
Dept: URBAN - METROPOLITAN AREA CLINIC 244 | Age: 65
Setting detail: DERMATOLOGY
End: 2025-03-31

## 2025-03-31 DIAGNOSIS — L82.1 OTHER SEBORRHEIC KERATOSIS: ICD-10-CM

## 2025-03-31 DIAGNOSIS — D18.0 HEMANGIOMA: ICD-10-CM

## 2025-03-31 DIAGNOSIS — L81.4 OTHER MELANIN HYPERPIGMENTATION: ICD-10-CM

## 2025-03-31 DIAGNOSIS — Z85.828 PERSONAL HISTORY OF OTHER MALIGNANT NEOPLASM OF SKIN: ICD-10-CM

## 2025-03-31 DIAGNOSIS — Z12.83 ENCOUNTER FOR SCREENING FOR MALIGNANT NEOPLASM OF SKIN: ICD-10-CM

## 2025-03-31 DIAGNOSIS — D22 MELANOCYTIC NEVI: ICD-10-CM

## 2025-03-31 DIAGNOSIS — L57.0 ACTINIC KERATOSIS: ICD-10-CM

## 2025-03-31 PROBLEM — D22.9 MELANOCYTIC NEVI, UNSPECIFIED: Status: ACTIVE | Noted: 2025-03-31

## 2025-03-31 PROBLEM — D18.01 HEMANGIOMA OF SKIN AND SUBCUTANEOUS TISSUE: Status: ACTIVE | Noted: 2025-03-31

## 2025-03-31 PROCEDURE — 17003 DESTRUCT PREMALG LES 2-14: CPT

## 2025-03-31 PROCEDURE — OTHER COUNSELING: OTHER

## 2025-03-31 PROCEDURE — 99213 OFFICE O/P EST LOW 20 MIN: CPT | Mod: 25

## 2025-03-31 PROCEDURE — OTHER MIPS QUALITY: OTHER

## 2025-03-31 PROCEDURE — 17000 DESTRUCT PREMALG LESION: CPT

## 2025-03-31 PROCEDURE — OTHER LIQUID NITROGEN: OTHER

## 2025-03-31 ASSESSMENT — LOCATION DETAILED DESCRIPTION DERM
LOCATION DETAILED: LEFT SUPERIOR PREAURICULAR CHEEK
LOCATION DETAILED: EPIGASTRIC SKIN
LOCATION DETAILED: LEFT LATERAL ZYGOMA
LOCATION DETAILED: LEFT SUPERIOR LATERAL MALAR CHEEK

## 2025-03-31 ASSESSMENT — LOCATION ZONE DERM
LOCATION ZONE: TRUNK
LOCATION ZONE: FACE

## 2025-03-31 ASSESSMENT — LOCATION SIMPLE DESCRIPTION DERM
LOCATION SIMPLE: LEFT CHEEK
LOCATION SIMPLE: ABDOMEN
LOCATION SIMPLE: LEFT ZYGOMA

## 2025-03-31 NOTE — PROCEDURE: LIQUID NITROGEN
Consent: The patient's consent was obtained including but not limited to risks of crusting, scabbing, blistering, scarring, darker or lighter pigmentary change, recurrence, incomplete removal and infection.
Detail Level: Detailed
Render Post-Care Instructions In Note?: no
Show Aperture Variable?: Yes
Duration Of Freeze Thaw-Cycle (Seconds): 3
Number Of Freeze-Thaw Cycles: 3 freeze-thaw cycles
Post-Care Instructions: I reviewed with the patient in detail post-care instructions. Patient is to wear sunprotection, and avoid picking at any of the treated lesions. Pt may apply Vaseline to crusted or scabbing areas.

## 2025-04-09 ENCOUNTER — APPOINTMENT (OUTPATIENT)
Dept: CT IMAGING | Facility: HOSPITAL | Age: 65
End: 2025-04-09
Attending: NURSE PRACTITIONER
Payer: COMMERCIAL

## 2025-04-09 ENCOUNTER — HOSPITAL ENCOUNTER (OUTPATIENT)
Age: 65
Discharge: HOME | End: 2025-04-09
Payer: COMMERCIAL

## 2025-04-09 VITALS
RESPIRATION RATE: 18 BRPM | OXYGEN SATURATION: 100 % | DIASTOLIC BLOOD PRESSURE: 71 MMHG | HEART RATE: 64 BPM | SYSTOLIC BLOOD PRESSURE: 135 MMHG | TEMPERATURE: 98 F

## 2025-04-09 DIAGNOSIS — N20.0 KIDNEY STONE: Primary | ICD-10-CM

## 2025-04-09 DIAGNOSIS — R10.9 FLANK PAIN: ICD-10-CM

## 2025-04-09 LAB
#MXD IC: 0.5 X10ˆ3/UL (ref 0.1–1)
BILIRUB UR QL STRIP: NEGATIVE
BUN BLD-MCNC: 21 MG/DL (ref 7–18)
CHLORIDE BLD-SCNC: 107 MMOL/L (ref 98–112)
CLARITY UR: CLEAR
CO2 BLD-SCNC: 26 MMOL/L (ref 21–32)
COLOR UR: YELLOW
CREAT BLD-MCNC: 1.1 MG/DL (ref 0.55–1.02)
EGFRCR SERPLBLD CKD-EPI 2021: 56 ML/MIN/1.73M2 (ref 60–?)
GLUCOSE BLD-MCNC: 75 MG/DL (ref 70–99)
GLUCOSE UR STRIP-MCNC: NEGATIVE MG/DL
HCT VFR BLD AUTO: 37.4 % (ref 35–48)
HCT VFR BLD CALC: 38 % (ref 34–50)
HGB BLD-MCNC: 12.4 G/DL (ref 12–16)
ISTAT IONIZED CALCIUM FOR CHEM 8: 1.25 MMOL/L (ref 1.12–1.32)
KETONES UR STRIP-MCNC: NEGATIVE MG/DL
LYMPHOCYTES # BLD AUTO: 2.1 X10ˆ3/UL (ref 1–4)
LYMPHOCYTES NFR BLD AUTO: 34.3 %
MCH RBC QN AUTO: 30.5 PG (ref 26–34)
MCHC RBC AUTO-ENTMCNC: 33.2 G/DL (ref 31–37)
MCV RBC AUTO: 92.1 FL (ref 80–100)
MIXED CELL %: 9 %
NEUTROPHILS # BLD AUTO: 3.4 X10ˆ3/UL (ref 1.5–7.7)
NEUTROPHILS NFR BLD AUTO: 56.7 %
NITRITE UR QL STRIP: NEGATIVE
PH UR STRIP: 7 [PH]
PLATELET # BLD AUTO: 293 X10ˆ3/UL (ref 150–450)
POTASSIUM BLD-SCNC: 4 MMOL/L (ref 3.6–5.1)
PROT UR STRIP-MCNC: 30 MG/DL
RBC # BLD AUTO: 4.06 X10ˆ6/UL (ref 3.8–5.3)
SODIUM BLD-SCNC: 141 MMOL/L (ref 136–145)
SP GR UR STRIP: 1.02
UROBILINOGEN UR STRIP-ACNC: <2 MG/DL
WBC # BLD AUTO: 6 X10ˆ3/UL (ref 4–11)

## 2025-04-09 PROCEDURE — 85025 COMPLETE CBC W/AUTO DIFF WBC: CPT | Performed by: NURSE PRACTITIONER

## 2025-04-09 PROCEDURE — 99214 OFFICE O/P EST MOD 30 MIN: CPT | Performed by: NURSE PRACTITIONER

## 2025-04-09 PROCEDURE — 80047 BASIC METABLC PNL IONIZED CA: CPT | Performed by: NURSE PRACTITIONER

## 2025-04-09 PROCEDURE — 81002 URINALYSIS NONAUTO W/O SCOPE: CPT | Performed by: NURSE PRACTITIONER

## 2025-04-09 PROCEDURE — 74176 CT ABD & PELVIS W/O CONTRAST: CPT | Performed by: NURSE PRACTITIONER

## 2025-04-09 RX ORDER — KETOROLAC TROMETHAMINE 10 MG/1
10 TABLET, FILM COATED ORAL EVERY 6 HOURS PRN
Qty: 30 TABLET | Refills: 0 | Status: SHIPPED | OUTPATIENT
Start: 2025-04-09 | End: 2025-04-17

## 2025-04-09 RX ORDER — SULFAMETHOXAZOLE AND TRIMETHOPRIM 800; 160 MG/1; MG/1
1 TABLET ORAL 2 TIMES DAILY
Qty: 10 TABLET | Refills: 0 | Status: SHIPPED | OUTPATIENT
Start: 2025-04-09 | End: 2025-04-14

## 2025-04-09 RX ORDER — HYDROCODONE BITARTRATE AND ACETAMINOPHEN 5; 325 MG/1; MG/1
1-2 TABLET ORAL EVERY 6 HOURS PRN
Qty: 10 TABLET | Refills: 0 | Status: SHIPPED | OUTPATIENT
Start: 2025-04-09 | End: 2025-04-14

## 2025-04-09 RX ORDER — TAMSULOSIN HYDROCHLORIDE 0.4 MG/1
0.4 CAPSULE ORAL DAILY
Qty: 30 CAPSULE | Refills: 0 | Status: SHIPPED | OUTPATIENT
Start: 2025-04-09 | End: 2025-05-09

## 2025-04-09 RX ORDER — FLUCONAZOLE 150 MG/1
150 TABLET ORAL DAILY
Qty: 2 TABLET | Refills: 0 | Status: SHIPPED | OUTPATIENT
Start: 2025-04-09 | End: 2025-04-11

## 2025-04-09 NOTE — ED INITIAL ASSESSMENT (HPI)
Pt c/o L low back pain started while working out, urinary frequency x3 days.  States feels the same as when she had a kidney stone.  No dysuria.   No fever.  Last dose advil at 315pm today.

## 2025-04-09 NOTE — DISCHARGE INSTRUCTIONS
KIDNEY STONE TREATMENT   Treatment of a kidney stone that is causing obstruction depends upon the size and location of the stone, as well as your pain level and ability to keep down fluids. If your stone is small enough to be likely to pass, your pain is tolerable, and you are able to eat and drink, your health care provider will likely suggest treatment at home.  If you have severe pain or nausea, you will need to be treated with stronger pain medications and intravenous (IV) fluids, which are often given in the hospital. If you have a fever, you will also need treatment in the hospital as soon as possible, as this could indicate a potentially serious infection.  Home treatment -- Managing a kidney stone at home involves treating your pain as needed, taking medication to help the stone pass more quickly (if your health care provider recommends this), and straining your urine so that the stone can be saved for testing once it does pass.  Pain relief -- You can take non-prescription pain medication until the stone passes. This includes over-the-counter nonsteroidal antiinflammatory drugs (NSAIDs) such as ibuprofen (sample brand names: Advil, Motrin) or naproxen (sample brand names: Aleve, Naprosyn), but it is important to check with your doctor first. People with certain health conditions should not take NSAIDs.  Facilitating stone passage -- There are several different medications that can shorten the time until passage of a kidney stone, such as tamsulosin (brand name: Flomax). Depending on your situation and the size of your stone, your provider may recommend taking one of these medications for several weeks, until the stone passes. Note that these medications do not cause the stone to pass immediately; it still may take several days or longer.  Straining your urine -- You will probably be asked to strain your urine to recover the stone. After you retrieve it, you should bring it to your health care provider so it  can be analyzed in a laboratory. Knowing what type of kidney stone you have is important in planning treatments to prevent future stones. (See 'Preventing future kidney stones' below.)  If the stone does not pass -- Stones larger than 9 or 10 millimeters rarely pass on their own and generally require a procedure to break up or remove the stone. Some smaller stones also do not pass. Several procedures are available if your stone does not pass on its own; your health care provider will likely refer you to a specialist called a \"urologist\" who can discuss your options with you and recommend the best approach for your situation.  Ureteroscopy -- Ureteroscopy is a common procedure that uses a thin telescope, which is passed through the urethra and bladder, into the ureter and kidney (figure 1). This allows the doctor to see the stone and remove it or to break it into smaller pieces that can pass more easily. Ureteroscopy is often used to remove stones blocking the ureter and sometimes for small stones in the kidney.  Shock wave lithotripsy (SWL) -- This procedure involves directing high-energy shock waves toward the stone. These sound waves pass through the skin and internal body tissues and release energy at the stone surface. This energy causes the stone to break into fragments that can be more easily passed in the urine.  Shock wave lithotripsy is a reasonable option for many people who need help breaking up a stone. Lithotripsy is particularly good for stones 1 cm or less in the kidney and upper ureter. Shock wave lithotripsy is not effective for treating large or hard stones. You may require medication to make you sleepy and reduce pain during this treatment, although this depends upon the type of equipment used.  Percutaneous nephrolithotomy (PNL) -- This is a minimally invasive surgical procedure in which a small telescope is passed through the skin of the back and into the kidney to remove the stone. Very large  or complex stones, or large stones that do not respond to shock wave lithotripsy (see 'Shock wave lithotripsy (SWL)' above) or ureteroscopy, may need to be removed this way.  Asymptomatic stones -- If you have a kidney stone that was identified on an imaging test but is causing no symptoms, you may or may not need to have it removed right away. This decision is based upon the size and location of your stone, as well as your ability to get treatment quickly if symptoms were to develop. If there is a chance that you would not be able to get treatment quickly (eg, if you travel frequently), your health care provider is more likely to advise that you have the stone removed.

## 2025-04-09 NOTE — ED PROVIDER NOTES
Patient Seen in: [facility]      History     Chief Complaint   Patient presents with    Back Pain     Stated Complaint: Back Pain    Subjective:   HPI      64-year-old female presents with left-sided back pain that increased with intensity today.  Has noted increased urinary frequency, along with increased back pain left-sided.  Has increase p.o. fluid intake.  He is unsure of gross hematuria.  Denies dysuria.  No nausea nor vomiting.  Last dose of ibuprofen was at 1515 today.    Objective:     Past Medical History:    Arm mass    per NG Rt arm    Basal cell carcinoma of face    per NG left face; excision    Brain tumor (HCC)    per NG surgery at [facility]-    Calculus of kidney    Cancer (HCC)    Hernia of unspecified site of abdominal cavity without mention of obstruction or gangrene    per NG hernia    Internal hemorrhoids without complication    Osteoarthritis    Raynaud's disease              Past Surgical History:   Procedure Laterality Date    Colonoscopy N/A 2019    Procedure: COLONOSCOPY;  Surgeon: Angela Marx MD;  Location: [facility] ENDOSCOPY    Excis primary ganglion wrist Right     per NG [facility] wrist ganglion removal    Hernia surgery      per NG repair    Needle biopsy left  2019    us guided bx      94, 97    Other surgical history Right     per NG benign removed (rt arm)    Repair rotator cuff,acute      Skin surgery      Skin cancer    Tubal ligation      Upper arm/elbow surgery unlisted      per NG shoulder surgery                Social History     Socioeconomic History    Marital status:    Tobacco Use    Smoking status: Never    Smokeless tobacco: Never   Vaping Use    Vaping status: Never Used   Substance and Sexual Activity    Alcohol use: Never    Drug use: Never    Sexual activity: Yes     Partners: Male   Other Topics Concern    Caffeine Concern Yes     Comment: per NG 1 cup of coffee daily              Review of  Systems    Positive for stated complaint: Back Pain  Other systems are as noted in HPI.  Constitutional and vital signs reviewed.      All other systems reviewed and negative except as noted above.    Physical Exam     ED Triage Vitals [04/09/25 1640]   /71   Pulse 64   Resp 18   Temp 97.7 °F (36.5 °C)   Temp src Oral   SpO2 100 %   O2 Device None (Room air)       Current Vitals:   Vital Signs  BP: 135/71  Pulse: 64  Resp: 18  Temp: 97.7 °F (36.5 °C)  Temp src: Oral    Oxygen Therapy  SpO2: 100 %  O2 Device: None (Room air)        Physical Exam  Vitals and nursing note reviewed.   Constitutional:       General: She is not in acute distress.     Appearance: She is not toxic-appearing.   HENT:      Head: Normocephalic.      Nose: Nose normal. No congestion or rhinorrhea.      Mouth/Throat:      Mouth: Mucous membranes are moist.   Pulmonary:      Effort: Pulmonary effort is normal. No respiratory distress.   Abdominal:      General: Abdomen is flat.   Musculoskeletal:         General: Normal range of motion.      Cervical back: Normal range of motion.   Skin:     General: Skin is warm and dry.      Capillary Refill: Capillary refill takes less than 2 seconds.   Neurological:      Mental Status: She is alert.             ED Course     Labs Reviewed   [facility] POCT URINALYSIS DIPSTICK - Abnormal; Notable for the following components:       Result Value    Protein urine 30 (*)     Blood, Urine Moderate (*)     Leukocyte esterase urine Moderate (*)     All other components within normal limits   POCT ISTAT CHEM8 CARTRIDGE - Abnormal; Notable for the following components:    ISTAT BUN 21 (*)     ISTAT Creatinine 1.10 (*)     eGFR-Cr 56 (*)     All other components within normal limits   POCT CBC   URINE CULTURE, ROUTINE       ED Course as of 04/09/25 1920  ------------------------------------------------------------  Time: 04/09 1827  Value: CT ABDOMEN+PELVIS KIDNEYSTONE 2D RNDR(NO IV,NO ORAL)(CPT=74176)  Comment: 0.4 cm  obstructing calculus at the left ureterovesicular junction with mild left hydroureteronephrosis.  There are multiple nonobstructing bilateral caliceal calculi.  Largest measures approximately 0.4 cm within the right lower pole and 0.3 cm   within the left midpole.  Overall stone burden has progressed since 8/3/2022.                 MDM             Medical Decision Making  64-year-old female presents with left back pain consistent with previous history of kidney stone.    CBC is normal, no elevation in WBC count, no left shift, no anemia is noted.  Clin CHEM is also normal.  No elevation in glucose, GFR is normal.    UA> positive protein, moderate blood, moderate leukocytes.  Will send for culture    Will send for CT scan to rule out kidney stones, patient has history of multiple punctuate stones in bilateral kidneys.  Refusal of IV fluids at this time.    CT abd/pelvis kidney stone>0.4 cm obstructing calculus at the left ureterovesicular junction with mild left hydroureteronephrosis.  There are multiple nonobstructing bilateral caliceal calculi.  Largest measures approximately 0.4 cm within the right lower pole and 0.3 cm   within the left midpole.  Overall stone burden has progressed since 8/3/2022.     Discussed care w/Dr. Alegria regarding care.  Recommendations given including Flomax 0.4 mg x 30 days, Bactrim, Norco, Toradol.  Increase po fluid.     Physical exam remained stable over serial reexaminations as previously documented. External chart review completed. No recent hospitalizations for the same.      I have discussed with the patient the results of tests, differential diagnosis, and warning signs and symptoms that should prompt immediate return.  The patient understands these instructions and agrees to the follow-up plan provided.  There are no barriers to learning.   Appropriate f/u given.  Patient agrees to return for any concerns/problems/complications.    Differential diagnosis reflecting the  complexity of care include: Nephrolithiasis, infected kidney stone, back pain, flank pain    Comorbidities that add complexity to management include: Previous history of kidney stones    External chart review was done and was noted:Yes    History obtained by an independent source was from: Patient    Discussions of management was done with:Patient    My independent interpretation of studies of: Labs    Diagnostic tests and medications considered but not ordered were: None    Social determinants of health that affect care:NA    Shared decision making was done by Self, Patient  Discuss with Dr. Holland          Disposition and Plan     Clinical Impression:  1. Kidney stone    2. Flank pain         Disposition:  Discharge  4/9/2025  7:15 pm    Follow-up:  Elizabeth Alegria MD  [address] [id number] [phone]    On 4/10/2025  0945-please arrive early for appt 10-15 minutes, [facility] urology    Zandra Powell MD  [address] [place] [id number] [phone]                Medications Prescribed:  Current Discharge Medication List        START taking these medications    Details   tamsulosin (FLOMAX) 0.4 MG Oral Cap Take 1 capsule (0.4 mg total) by mouth daily.  Qty: 30 capsule, Refills: 0      sulfamethoxazole-trimethoprim -160 MG Oral Tab per tablet Take 1 tablet by mouth 2 (two) times daily for 5 days.  Qty: 10 tablet, Refills: 0      HYDROcodone-acetaminophen 5-325 MG Oral Tab Take 1-2 tablets by mouth every 6 (six) hours as needed for Pain.  Qty: 10 tablet, Refills: 0    Associated Diagnoses: Kidney stone      Ketorolac Tromethamine 10 MG Oral Tab Take 1 tablet (10 mg total) by mouth every 6 (six) hours as needed for Pain.  Qty: 30 tablet, Refills: 0      fluconazole 150 MG Oral Tab Take 1 tablet (150 mg total) by mouth daily for 2 doses. Take one pill on day one of symptoms and then another pill on day three of symptoms if any.  Qty: 2 tablet, Refills: 0                  Supplementary Documentation:

## 2025-04-10 ENCOUNTER — TELEPHONE (OUTPATIENT)
Dept: SURGERY | Facility: CLINIC | Age: 65
End: 2025-04-10

## 2025-04-10 ENCOUNTER — OFFICE VISIT (OUTPATIENT)
Dept: SURGERY | Facility: CLINIC | Age: 65
End: 2025-04-10

## 2025-04-10 DIAGNOSIS — N20.0 NEPHROLITHIASIS: Primary | ICD-10-CM

## 2025-04-10 PROCEDURE — 99204 OFFICE O/P NEW MOD 45 MIN: CPT | Performed by: UROLOGY

## 2025-04-10 NOTE — PROGRESS NOTES
Elizabeth Alegria MD  Department of Urology  [address] [place] [address] Rd[address]  [place] [address]    T: [phone]  F: [phone]    To: Zandra Powell MD   [address] [place] [address]    Re: Urszula Cook   MRN: [id number]  : 1960    Dear Zandra Powell MD,    Today I had the pleasure of seeing Urszula Cook in my clinic. As you know, Ms. Cook is a pleasant 64 year old year old female who I am seeing for kidney stones. Patient was last seen in this department on Visit date not found.    Briefly, patient went to the urgent care yesterday with flank pain.  In the urgent care she had labs that demonstrated a creatinine of 1.1, white blood cell count of 6.0 and a urine analysis that was negative for nitrites.  She did have a CT scan that demonstrated a 0.4 cm obstructing stone at the left uvj.       PAST MEDICAL HISTORY:  Past Medical History[1]     PAST SURGICAL HISTORY:  Past Surgical History[2]      ALLERGIES:  Allergies[3]      MEDICATIONS:  Current Outpatient Medications   Medication Instructions    Calcium 500 MG Oral Chew Tab 2 each, Daily    Cholecalciferol (VITAMIN D) 25 MCG (1000 UT) Oral Tab No dose, route, or frequency recorded.    fluconazole (DIFLUCAN) 150 mg, Oral, Daily, Take one pill on day one of symptoms and then another pill on day three of symptoms if any.    HYDROcodone-acetaminophen 5-325 MG Oral Tab 1-2 tablets, Oral, Every 6 hours PRN    Ketorolac Tromethamine (TORADOL) 10 mg, Oral, Every 6 hours PRN    Probiotic Product (PROBIOTIC-10) Oral Cap Take by mouth.    sulfamethoxazole-trimethoprim -160 MG Oral Tab per tablet 1 tablet, Oral, 2 times daily    tamsulosin (FLOMAX) 0.4 mg, Oral, Daily        FAMILY HISTORY:  Family History[4]     SOCIAL HISTORY:  Short Social Hx on File[5]       PHYSICAL EXAMINATION:  There were no vitals filed for this visit.  CONSTITUTIONAL: No apparent distress, cooperative and communicative  NEUROLOGIC: Alert and oriented    HEAD: Normocephalic, atraumatic   EYES: Sclera non-icteric   ENT: Hearing intact, moist mucous membranes   NECK: No obvious goiter or masses   RESPIRATORY: Normal respiratory effort, Nonlabored breathing on room air  SKIN: No evident rashes   ABDOMEN: Soft, nontender, nondistended, no rebound tenderness, no guarding, no masses      REVIEW OF SYSTEMS:    A comprehensive 10-point review of systems was completed.  Pertinent positives and negatives are noted in the the HPI.       LABORATORY DATA:  Component  Ref Range & Units (hover) 4/9/25  4:48 PM   Urine Color Yellow   Urine Clarity Clear   Specific Gravity, Urine 1.020   PH, Urine 7.0   Protein urine 30 Abnormal    Glucose, Urine Negative   Ketone, Urine Negative   Bilirubin, Urine Negative   Blood, Urine Moderate Abnormal    Nitrite Urine Negative   Urobilinogen urine <2.0   Leukocyte esterase urine Moderate Abnormal          Component  Ref Range & Units (hover) 4/9/25  5:00 PM   WBC IC 6.0   RBC IC 4.06   HGB IC 12.4   HCT IC 37.4   MCV IC 92.1   MCH IC 30.5   MCHC IC 33.2   PLT .0   # Neutrophil 3.4   # Lymphocyte 2.1   # Mixed Cells 0.5   Neutrophil % 56.7   Lymphocyte % 34.3   Mixed Cell % 9.0         Component  Ref Range & Units (hover) 4/9/25  5:09 PM   ISTAT Sodium 141   ISTAT BUN 21 High    ISTAT Potassium 4.0   ISTAT Chloride 107   ISTAT Ionized Calcium 1.25   ISTAT Hematocrit 38   ISTAT Glucose 75   ISTAT TCO2 26   ISTAT Creatinine 1.10 High    Comment: This test may exhibit falsely elevated results when a sample is collected from a patient who is presently taking Hydroxyurea. If patient is consuming Hydroxyurea, i-STAT creatinine analysis should be considered inaccurate and a sample should be referred to the Central Lab for analysis, if clinically indicated.   eGFR-Cr 56 Low    [facility] ([facility])             Specimen Collected: 04/09/25  5:09 PM Last Resulted: 04/09/25  5:13 PM        Lab Flowsheet        Order Details        View  Encounter        Lab and Collection Details        Routing        Result History     View All Conversations on this Encounter     Result Care Coordination      Patient Communication              IMAGING REVIEW:  Narrative   PROCEDURE:   CT ABDOMEN + PELVIS KIDNEYSTONE 2D RNDR (NO IV NO ORAL) (CPT=74176)     COMPARISON: [facility] [place] [facility], CT ABDOMEN + PELVIS KIDNEYSTONE 2D RNDR (NO IV NO ORAL) (CPT=741, 8/03/2022, 11:43 AM.  [facility] [place] [facility], CT ABDOMEN & PELVIS W CON, 7/31/2013, 10:33 AM.     INDICATIONS: Lower back pain and urinary frequency.     TECHNIQUE:   CT images of the abdomen and pelvis were obtained without intravenous contrast material.  Automated exposure control for dose reduction was used. Adjustment of the mA and/or kV was done based on the patient's size. Use of iterative  reconstruction technique for dose reduction was used. Dose information is transmitted to the ACR (American College of Radiology) NRDR (National Radiology Data Registry) which includes the Dose Index Registry.        Evaluation of the parenchymal organs is somewhat limited due to lack of IV contrast.     FINDINGS:  KIDNEYS: 0.4 cm obstructing calculus at the left ureterovesicular junction with mild left hydroureteronephrosis.  There are multiple nonobstructing bilateral caliceal calculi.  Largest measures approximately 0.4 cm within the right lower pole and 0.3 cm  within the left midpole.  Overall stone burden has progressed since 8/3/2022.  BLADDER: There is diffuse bladder wall thickening with mild pericystic inflammation.       LIVER: Unremarkable.  GALLBLADDER: Unremarkable  BILIARY: No intra-or extrahepatic biliary ductal dilation.  SPLEEN: Unremarkable  PANCREAS: No gross ductal dilation.  ADRENALS: Unremarkable  AORTA/VASCULAR:   No aneurysmal dilation.  RETROPERITONEUM: No mass or enlarged adenopathy.    BOWEL: The appendix is normal. There are no inflammatory changes  within the right lower quadrant.  Large amount of excessive stool seen throughout the colon. Remainder of the bowel is otherwise unremarkable without dilation or wall thickening.  MESENTERY: No mass or hernia.    PELVIS: No enlarged mass or adenopathy.  BONES:   There is mild degenerative disease of the thoracic and lumbar spine.  Mild degenerative changes are seen within the sacroiliac joints and pubic symphysis.  Mild degenerative changes are seen in the bilateral hips.  LUNG BASES: There is bibasilar and lingular atelectasis and scarring. There are coronary artery calcifications.               Impression   CONCLUSION:     0.4 cm obstructing calculus within the left ureterovesicular junction with mild left hydroureteronephrosis.     Multiple nonobstructing bilateral caliceal calculi.  Stone burden has progressed since 8/3/2022.     Bladder wall thickening, greater than what is expected for under distention can be seen with cystitis. Correlation with urinalysis suggested.     Large amount of excessive stool seen throughout the colon suggestive of constipation. No obstruction.     Multiple other incidental findings as described in the body of the report.             Dictated by (CST): Obed Duckworth MD on 4/09/2025 at 6:21 PM      Finalized by (CST): Obed Duckworth MD on 4/09/2025 at 6:28 PM              OTHER RELEVANT DATA:   none     IMPRESSION: left UVJ stone and multiple bilateral obstructing stones.  I offered medical expulsive therapy and observation of bilateral kidney stones versus definitive ureteroscopy.  She would like to pursue left ureteroscopy with treatment of all those stones.  She may want to consider right-sided ureteroscopy in the future pending left procedure.    She will also eventually need a 24-hour urine test and stone prevention counseling      Medical expulsive therapy includes increased hydration, straining of urine, pain control with ibuprofen, Tylenol, hot packs, monitoring for fevers and  chills.  We usually pursue this over 4 to 6 weeks.  If patient passes the stone, no further intervention is needed.  If patient does not pass the stone in this timeframe, we can consider a CT scan to determine if the stone is present in which case we would proceed with surgery    We discussed ureteroscopy including how it is performed and associated risks. I reviewed risks including bleeding, infection, damage to surrounding structures (urethra, bladder, ureter, kidney), inability to treat the stone and need for stent alone, need for additional/multiple procedures, need for prolonged stent, need for nephrostomy tube, stent colic/discomfort (extreme flank pain, bladder discomfort/spasms, urinary urgency and frequency, hematuria), ureteral stricture, ureteral avulsion requiring open surgery, sepsis, anesthesia complications (cardiorespiratory complications).     I also counseled patient that NSAIDs and blood thinning agents need to be stopped appropriately prior to surgery. Discussed return precautions including fevers, chills, nausea, vomiting, intractable pain. Stent colic including flank pain, urgency, frequency and blood in the urine is common. Patient agreed with the plan and understood the above.       PLAN:  OR: Cystoscopy, left ureteroscopy, laser lithotripsy, stent placement, retrograde pyelogram, possible right stent placement  Cbc, chem7, inr, urine culture  Hold NSAIDS 7-10d    Thank you for referring this very pleasant patient to my clinic. If you have any questions or concerns, please do not hesitate to contact me.    Sincerely,  Elizabeth Alegria MD    30 minutes were spent on this patient at this visit obtaining a history, reviewing medical records, developing a treatment plan, counseling and discussing treatment strategy with patient, coordination of care and documentation.     The 21st Century Cures Act makes medical notes available to patients in the interest of transparency.  However, please be  advised that this is a medical document.  It is intended as a peer to peer communication.  It is written in medical language and may contain abbreviations or verbiage that are technical and unfamiliar.  It may appear blunt or direct.  Medical documents are intended to carry relevant information, facts as evident, and the clinical opinion of the practitioner.         [1]   Past Medical History:   Arm mass    per NG Rt arm    Basal cell carcinoma of face    per NG left face; excision    Brain tumor (HCC)    per NG surgery at [facility]-    Calculus of kidney    Cancer (HCC)    Hernia of unspecified site of abdominal cavity without mention of obstruction or gangrene    per NG hernia    Internal hemorrhoids without complication    Osteoarthritis    Raynaud's disease   [2]   Past Surgical History:  Procedure Laterality Date    Colonoscopy N/A 2019    Procedure: COLONOSCOPY;  Surgeon: Angela Marx MD;  [facility] ENDOSCOPY    Excis primary ganglion wrist Right     per NG [facility] wrist ganglion removal    Hernia surgery      per NG repair    Needle biopsy left  2019    us guided bx      94, 97    Other surgical history Right     per NG benign removed (rt arm)    Repair rotator cuff,acute      Skin surgery      Skin cancer    Tubal ligation      Upper arm/elbow surgery unlisted      per NG shoulder surgery   [3] No Known Allergies  [4]   Family History  Problem Relation Age of Onset    Basal Cell Self     Diabetes Father     Arthritis Father         per NG osteoarthritis    Hypertension Father     Musculo-skelatal Disorder Father     Ovarian Cancer Sister 58    Cancer Sister     Breast Cancer Neg     Prostate Cancer Neg     Pancreatic Cancer Neg    [5]   Social History  Socioeconomic History    Marital status:    Tobacco Use    Smoking status: Never    Smokeless tobacco: Never   Vaping Use    Vaping status: Never Used   Substance and Sexual Activity    Alcohol use:  Never    Drug use: Never    Sexual activity: Yes     Partners: Male   Other Topics Concern    Caffeine Concern Yes     Comment: per NG 1 cup of coffee daily

## 2025-04-10 NOTE — TELEPHONE ENCOUNTER
Pt scheduled at clinic visit for Dr Alegria on 4/30/25 -ok'd with OR at 12n.     Pt aware to complete the preoperative test 7-10 days prior to surgery.     Surgery information given to pt.

## 2025-04-10 NOTE — TELEPHONE ENCOUNTER
Hi!  This patient needs a ureteroscopy. Should be booked for 1 hours in the next few weeks. Needs labs as ordered below. If scheduled in next 1-2 weeks, no need for extra labs. Otherwise yes need labs which I ordered    Thanks!  AR    Urology Surgery Scheduling Request    Location: [facility] OR    Surgeon: DANIELA Alegria MD    Asst. Surgeon: N/A    Diagnosis: Nephrolithiasis    Procedure: Cystoscopy left ureteroscopy, laser lithotripsy, retrograde pyelogram, stent placement; possible right stent and retrograde    Anesthesia: General     Time Frame: 1-4 weeks    Time needed: 1 hour    Special Equipment: Holmium Laser    On Call to OR: ancef    Admission: Day Surgery    Pre-op Testing: CBC, CMP, PT/INR and Urine Culture     Need Pre-op Clearance: none    Estimated Post Op/Follow Up Appt: tbd.    Elizabeth Alegria MD

## 2025-04-11 ENCOUNTER — LAB ENCOUNTER (OUTPATIENT)
Dept: LAB | Facility: REFERENCE LAB | Age: 65
End: 2025-04-11
Attending: UROLOGY
Payer: COMMERCIAL

## 2025-04-24 ENCOUNTER — LAB ENCOUNTER (OUTPATIENT)
Dept: LAB | Facility: HOSPITAL | Age: 65
End: 2025-04-24
Attending: UROLOGY
Payer: COMMERCIAL

## 2025-04-24 DIAGNOSIS — N20.0 NEPHROLITHIASIS: ICD-10-CM

## 2025-04-24 LAB
ANION GAP SERPL CALC-SCNC: 9 MMOL/L (ref 0–18)
BUN BLD-MCNC: 17 MG/DL (ref 9–23)
BUN/CREAT SERPL: 16 (ref 10–20)
CALCIUM BLD-MCNC: 9.7 MG/DL (ref 8.7–10.4)
CHLORIDE SERPL-SCNC: 107 MMOL/L (ref 98–112)
CO2 SERPL-SCNC: 25 MMOL/L (ref 21–32)
CREAT BLD-MCNC: 1.06 MG/DL (ref 0.55–1.02)
DEPRECATED RDW RBC AUTO: 40.8 FL (ref 35.1–46.3)
EGFRCR SERPLBLD CKD-EPI 2021: 59 ML/MIN/1.73M2 (ref 60–?)
ERYTHROCYTE [DISTWIDTH] IN BLOOD BY AUTOMATED COUNT: 11.8 % (ref 11–15)
FASTING STATUS PATIENT QL REPORTED: NO
GLUCOSE BLD-MCNC: 82 MG/DL (ref 70–99)
HCT VFR BLD AUTO: 36.6 % (ref 35–48)
HGB BLD-MCNC: 12.3 G/DL (ref 12–16)
INR BLD: 0.93 (ref 0.8–1.2)
MCH RBC QN AUTO: 31.6 PG (ref 26–34)
MCHC RBC AUTO-ENTMCNC: 33.6 G/DL (ref 31–37)
MCV RBC AUTO: 94.1 FL (ref 80–100)
OSMOLALITY SERPL CALC.SUM OF ELEC: 293 MOSM/KG (ref 275–295)
PLATELET # BLD AUTO: 271 10(3)UL (ref 150–450)
POTASSIUM SERPL-SCNC: 4.1 MMOL/L (ref 3.5–5.1)
PROTHROMBIN TIME: 13 SECONDS (ref 11.6–14.8)
RBC # BLD AUTO: 3.89 X10(6)UL (ref 3.8–5.3)
SODIUM SERPL-SCNC: 141 MMOL/L (ref 136–145)
WBC # BLD AUTO: 5.6 X10(3) UL (ref 4–11)

## 2025-04-24 PROCEDURE — 85027 COMPLETE CBC AUTOMATED: CPT

## 2025-04-24 PROCEDURE — 85610 PROTHROMBIN TIME: CPT

## 2025-04-24 PROCEDURE — 80048 BASIC METABOLIC PNL TOTAL CA: CPT

## 2025-04-24 PROCEDURE — 36415 COLL VENOUS BLD VENIPUNCTURE: CPT

## 2025-04-24 PROCEDURE — 87086 URINE CULTURE/COLONY COUNT: CPT

## 2025-04-29 NOTE — DISCHARGE INSTRUCTIONS
You should expect to have some blood in your urine, burning when you pee, urgency and frequency. This is normal. If you have a fever >101F, chills, nausea, vomiting, inability to urinate please go to the emergency room for evaluation.   Pain is normal after this procedure. Try to take Tylenol ibuprofen and use hot packs. Drink as much water as possible. Additionally you can try over the counter pyridium if you notice bladder discomfort.    We have also prescribed you 3 days of an antibiotic that I request that you take.    You should remove your stent in 7 days. It is taped to the top part of your groin. You can take the tape off and pull the black string. You should see 2 curls of the stent when it is removed. If you do not feel comfortable removing it yourself you should contact us and we can help arrange follow up. Please BE CAREFUL not to inadvertently pull on the black string (stent) taped to your groin. This may cause the stent to dislodge slightly and lead to continuous urine leakage (incontinence). We will ask you to wear a pad or depend if this happens until your stent can come out (7 days).    You have a follow up in 6 to 8 weeks with a renal bladder ultrasound and follow-up with your physician or a nurse practitioner. This is to make sure that your kidney still looks healthy.    Stone prevention methods including hydration (until urine is clear), limiting salt and red meat/meat intake, eating a normal calcium diet, and drinking lemon with water. We also talked about reasons to go to the emergency room - namely acute flank pain associated with fevers, chills, nausea or vomiting.   Call T: [phone] if you have any questions or concerns    TO DO:  1. Schedule ultrasound 6-8 weeks after stent is removed  2. Schedule follow up with Dr. Alegria 1 week after ultrasound         HOME INSTRUCTIONS  AMBSURG HOME CARE INSTRUCTIONS: POST-OP ANESTHESIA  The medication that you received for sedation or general  anesthesia can last up to 24 hours. Your judgment and reflexes may be altered, even if you feel like your normal self.      We Recommend:   Do not drive any motor vehicle or bicycle   Avoid mowing the lawn, playing sports, or working with power tools/applicances (power saws, electric knives or mixers)   That you have someone stay with you on your first night home   Do not drink alcohol or take sleeping pills or tranquilizers   Do not sign legal documents within 24 hours of your procedure   If you had a nerve block for your surgery, take extra care not to put any pressure on your arm or hand for 24 hours    It is normal:  For you to have a sore throat if you had a breathing tube during surgery (while you were asleep!). The sore throat should get better within 48 hours. You can gargle with warm salt water (1/2 tsp in 4 oz warm water) or use a throat lozenge for comfort  To feel muscle aches or soreness especially in the abdomen, chest or neck. The achy feeling should go away in the next 24 hours  To feel weak, sleepy or \"wiped out\". Your should start feeling better in the next 24 hours.   To experience mild discomforts such as sore lip or tongue, headache, cramps, gas pains or a bloated feeling in your abdomen.   To experience mild back pain or soreness for a day or two if you had spinal or epidural anesthesia.   If you had laparoscopic surgery, to feel shoulder pain or discomfort on the day of surgery.   For some patients to have nausea after surgery/anesthesia    If you feel nausea or experience vomiting:   Try to move around less.   Eat less than usual or drink only liquids until the next morning   Nausea should resolve in about 24 hours    If you have a problem when you are at home:    Call your surgeons office   Discharge Instructions: After Your Surgery  You’ve just had surgery. During surgery, you were given medicine called anesthesia to keep you relaxed and free of pain. After surgery, you may have some pain  or nausea. This is common. Here are some tips for feeling better and getting well after surgery.   Going home  Your healthcare provider will show you how to take care of yourself when you go home. They'll also answer your questions. Have an adult family member or friend drive you home. For the first 24 hours after your surgery:   Don't drive or use heavy equipment.  Don't make important decisions or sign legal papers.  Take medicines as directed.  Don't drink alcohol.  Have someone stay with you, if needed. They can watch for problems and help keep you safe.  Be sure to go to all follow-up visits with your healthcare provider. And rest after your surgery for as long as your provider tells you to.   Coping with pain  If you have pain after surgery, pain medicine will help you feel better. Take it as directed, before pain becomes severe. Also, ask your healthcare provider or pharmacist about other ways to control pain. This might be with heat, ice, or relaxation. And follow any other instructions your surgeon or nurse gives you.      Stay on schedule with your medicine.     Tips for taking pain medicine  To get the best relief possible, remember these points:   Pain medicines can upset your stomach. Taking them with a little food may help.  Most pain relievers taken by mouth need at least 20 to 30 minutes to start to work.  Don't wait till your pain becomes severe before you take your medicine. Try to time your medicine so that you can take it before starting an activity. This might be before you get dressed, go for a walk, or sit down for dinner.  Constipation is a common side effect of some pain medicines. Call your healthcare provider before taking any medicines, such as laxatives or stool softeners, to help ease constipation. Also ask if you should skip any foods. Drinking lots of fluids and eating foods, such as fruits and vegetables, that are high in fiber can also help. Remember, don't take laxatives unless  your surgeon has prescribed them.  Drinking alcohol and taking pain medicine can cause dizziness and slow your breathing. It can even be deadly. Don't drink alcohol while taking pain medicine.  Pain medicine can make you react more slowly to things. Don't drive or run machinery while taking pain medicine.  Your healthcare provider may tell you to take acetaminophen to help ease your pain. Ask them how much you're supposed to take each day. Acetaminophen or other pain relievers may interact with your prescription medicines or other over-the-counter (OTC) medicines. Some prescription medicines have acetaminophen and other ingredients in them. Using both prescription and OTC acetaminophen for pain can cause you to accidentally overdose. Read the labels on your OTC medicines with care. This will help you to clearly know the list of ingredients, how much to take, and any warnings. It may also help you not take too much acetaminophen. If you have questions or don't understand the information, ask your pharmacist or healthcare provider to explain it to you before you take the OTC medicine.   Managing nausea  Some people have an upset stomach (nausea) after surgery. This is often because of anesthesia, pain, or pain medicine, less movement of food in the stomach, or the stress of surgery. These tips will help you handle nausea and eat healthy foods as you get better. If you were on a special food plan before surgery, ask your healthcare provider if you should follow it while you get better. Check with your provider on how your eating should progress. It may depend on the surgery you had. These general tips may help:   Don't push yourself to eat. Your body will tell you when to eat and how much.  Start off with clear liquids and soup. They're easier to digest.  Next try semi-solid foods as you feel ready. These include mashed potatoes, applesauce, and gelatin.  Slowly move to solid foods. Don’t eat fatty, rich, or spicy  foods at first.  Don't force yourself to have 3 large meals a day. Instead eat smaller amounts more often.  Take pain medicines with a small amount of solid food, such as crackers or toast. This helps prevent nausea.  When to call your healthcare provider  Call your healthcare provider right away if you have any of these:   You still have too much pain, or the pain gets worse, after taking the medicine. The medicine may not be strong enough. Or there may be a complication from the surgery.  You feel too sleepy, dizzy, or groggy. The medicine may be too strong.  Side effects, such as nausea or vomiting. Your healthcare provider may advise taking other medicines to treat these or may change your treatment plan..  Skin changes, such as rash, itching, or hives. This may mean you have an allergic reaction. Your provider may advise taking other medicines.  The incision looks different (for instance, part of it opens up).  Bleeding or fluid leaking from the incision site, and you weren't told to expect that.  Fever of 100.4°F (38°C) or higher, or as directed by your healthcare provider.  Call 911  Call 911 right away if you have:   Trouble breathing  Facial swelling    If you have obstructive sleep apnea   You were given anesthesia medicine during surgery to keep you comfortable and free of pain. After surgery, you may have more apnea spells because of this medicine and other medicines you were given. The spells may last longer than normal.    At home:  Keep using the continuous positive airway pressure (CPAP) device when you sleep. Unless your healthcare provider tells you not to, use it when you sleep, day or night. CPAP is a common device used to treat obstructive sleep apnea.  Talk with your provider before taking any pain medicine, muscle relaxants, or sedatives. Your provider will tell you about the possible dangers of taking these medicines.  Contact your provider if your sleeping changes a lot even when taking  medicines as directed.  Emilia last reviewed this educational content on 4/1/2024  This information is for informational purposes only. This is not intended to be a substitute for professional medical advice, diagnosis, or treatment. Always seek the advice and follow the directions from your physician or other qualified health care provider.  © 5962-7532 [employer]. All rights reserved. This information is not intended as a substitute for professional medical care. Always follow your healthcare professional's instructions.

## 2025-04-30 ENCOUNTER — APPOINTMENT (OUTPATIENT)
Dept: GENERAL RADIOLOGY | Facility: HOSPITAL | Age: 65
End: 2025-04-30
Attending: UROLOGY
Payer: COMMERCIAL

## 2025-04-30 ENCOUNTER — TELEPHONE (OUTPATIENT)
Dept: SURGERY | Facility: CLINIC | Age: 65
End: 2025-04-30

## 2025-04-30 ENCOUNTER — ANESTHESIA (OUTPATIENT)
Dept: SURGERY | Facility: HOSPITAL | Age: 65
End: 2025-04-30
Payer: COMMERCIAL

## 2025-04-30 ENCOUNTER — HOSPITAL ENCOUNTER (OUTPATIENT)
Facility: HOSPITAL | Age: 65
Setting detail: HOSPITAL OUTPATIENT SURGERY
Discharge: HOME | End: 2025-04-30
Attending: UROLOGY | Admitting: UROLOGY
Payer: COMMERCIAL

## 2025-04-30 ENCOUNTER — ANESTHESIA EVENT (OUTPATIENT)
Dept: SURGERY | Facility: HOSPITAL | Age: 65
End: 2025-04-30
Payer: COMMERCIAL

## 2025-04-30 VITALS
SYSTOLIC BLOOD PRESSURE: 126 MMHG | HEART RATE: 47 BPM | RESPIRATION RATE: 14 BRPM | TEMPERATURE: 98 F | HEIGHT: 65 IN | OXYGEN SATURATION: 99 % | DIASTOLIC BLOOD PRESSURE: 67 MMHG | BODY MASS INDEX: 22.82 KG/M2 | WEIGHT: 137 LBS

## 2025-04-30 DIAGNOSIS — N20.0 NEPHROLITHIASIS: Primary | ICD-10-CM

## 2025-04-30 PROCEDURE — 74420 UROGRAPHY RTRGR +-KUB: CPT | Performed by: UROLOGY

## 2025-04-30 PROCEDURE — 52356 CYSTO/URETERO W/LITHOTRIPSY: CPT | Performed by: UROLOGY

## 2025-04-30 DEVICE — URETERAL STENT
Type: IMPLANTABLE DEVICE | Site: URETER | Status: FUNCTIONAL
Brand: ASCERTA™

## 2025-04-30 RX ORDER — ACETAMINOPHEN 500 MG
1000 TABLET ORAL ONCE
Status: COMPLETED | OUTPATIENT
Start: 2025-04-30 | End: 2025-04-30

## 2025-04-30 RX ORDER — SODIUM CHLORIDE, SODIUM LACTATE, POTASSIUM CHLORIDE, CALCIUM CHLORIDE 600; 310; 30; 20 MG/100ML; MG/100ML; MG/100ML; MG/100ML
INJECTION, SOLUTION INTRAVENOUS CONTINUOUS
Status: DISCONTINUED | OUTPATIENT
Start: 2025-04-30 | End: 2025-04-30

## 2025-04-30 RX ORDER — NALOXONE HYDROCHLORIDE 0.4 MG/ML
0.08 INJECTION, SOLUTION INTRAMUSCULAR; INTRAVENOUS; SUBCUTANEOUS AS NEEDED
Status: DISCONTINUED | OUTPATIENT
Start: 2025-04-30 | End: 2025-04-30

## 2025-04-30 RX ORDER — MIDAZOLAM HYDROCHLORIDE 1 MG/ML
INJECTION INTRAMUSCULAR; INTRAVENOUS AS NEEDED
Status: DISCONTINUED | OUTPATIENT
Start: 2025-04-30 | End: 2025-04-30 | Stop reason: SURG

## 2025-04-30 RX ORDER — MORPHINE SULFATE 4 MG/ML
4 INJECTION, SOLUTION INTRAMUSCULAR; INTRAVENOUS EVERY 10 MIN PRN
Status: DISCONTINUED | OUTPATIENT
Start: 2025-04-30 | End: 2025-04-30

## 2025-04-30 RX ORDER — HYDROMORPHONE HYDROCHLORIDE 1 MG/ML
0.4 INJECTION, SOLUTION INTRAMUSCULAR; INTRAVENOUS; SUBCUTANEOUS EVERY 5 MIN PRN
Refills: 0 | Status: DISCONTINUED | OUTPATIENT
Start: 2025-04-30 | End: 2025-04-30

## 2025-04-30 RX ORDER — TAMSULOSIN HYDROCHLORIDE 0.4 MG/1
0.4 CAPSULE ORAL DAILY
Qty: 30 CAPSULE | Refills: 0 | Status: SHIPPED | OUTPATIENT
Start: 2025-04-30 | End: 2025-05-30

## 2025-04-30 RX ORDER — ONDANSETRON 2 MG/ML
INJECTION INTRAMUSCULAR; INTRAVENOUS AS NEEDED
Status: DISCONTINUED | OUTPATIENT
Start: 2025-04-30 | End: 2025-04-30 | Stop reason: SURG

## 2025-04-30 RX ORDER — KETOROLAC TROMETHAMINE 30 MG/ML
INJECTION, SOLUTION INTRAMUSCULAR; INTRAVENOUS AS NEEDED
Status: DISCONTINUED | OUTPATIENT
Start: 2025-04-30 | End: 2025-04-30 | Stop reason: SURG

## 2025-04-30 RX ORDER — LIDOCAINE HYDROCHLORIDE 10 MG/ML
INJECTION, SOLUTION EPIDURAL; INFILTRATION; INTRACAUDAL; PERINEURAL AS NEEDED
Status: DISCONTINUED | OUTPATIENT
Start: 2025-04-30 | End: 2025-04-30 | Stop reason: SURG

## 2025-04-30 RX ORDER — DEXAMETHASONE SODIUM PHOSPHATE 4 MG/ML
VIAL (ML) INJECTION AS NEEDED
Status: DISCONTINUED | OUTPATIENT
Start: 2025-04-30 | End: 2025-04-30 | Stop reason: SURG

## 2025-04-30 RX ORDER — MORPHINE SULFATE 4 MG/ML
2 INJECTION, SOLUTION INTRAMUSCULAR; INTRAVENOUS EVERY 10 MIN PRN
Status: DISCONTINUED | OUTPATIENT
Start: 2025-04-30 | End: 2025-04-30

## 2025-04-30 RX ORDER — ROCURONIUM BROMIDE 10 MG/ML
INJECTION, SOLUTION INTRAVENOUS AS NEEDED
Status: DISCONTINUED | OUTPATIENT
Start: 2025-04-30 | End: 2025-04-30 | Stop reason: SURG

## 2025-04-30 RX ORDER — HYDROMORPHONE HYDROCHLORIDE 1 MG/ML
0.2 INJECTION, SOLUTION INTRAMUSCULAR; INTRAVENOUS; SUBCUTANEOUS EVERY 5 MIN PRN
Refills: 0 | Status: DISCONTINUED | OUTPATIENT
Start: 2025-04-30 | End: 2025-04-30

## 2025-04-30 RX ORDER — MORPHINE SULFATE 10 MG/ML
6 INJECTION, SOLUTION INTRAMUSCULAR; INTRAVENOUS EVERY 10 MIN PRN
Refills: 0 | Status: DISCONTINUED | OUTPATIENT
Start: 2025-04-30 | End: 2025-04-30

## 2025-04-30 RX ORDER — HYDROMORPHONE HYDROCHLORIDE 1 MG/ML
0.6 INJECTION, SOLUTION INTRAMUSCULAR; INTRAVENOUS; SUBCUTANEOUS EVERY 5 MIN PRN
Refills: 0 | Status: DISCONTINUED | OUTPATIENT
Start: 2025-04-30 | End: 2025-04-30

## 2025-04-30 RX ORDER — SULFAMETHOXAZOLE AND TRIMETHOPRIM 800; 160 MG/1; MG/1
1 TABLET ORAL 2 TIMES DAILY
Qty: 6 TABLET | Refills: 0 | Status: SHIPPED | OUTPATIENT
Start: 2025-04-30 | End: 2025-05-03

## 2025-04-30 RX ADMIN — LIDOCAINE HYDROCHLORIDE 50 MG: 10 INJECTION, SOLUTION EPIDURAL; INFILTRATION; INTRACAUDAL; PERINEURAL at 11:01:00

## 2025-04-30 RX ADMIN — DEXAMETHASONE SODIUM PHOSPHATE 4 MG: 4 MG/ML VIAL (ML) INJECTION at 11:08:00

## 2025-04-30 RX ADMIN — ROCURONIUM BROMIDE 50 MG: 10 INJECTION, SOLUTION INTRAVENOUS at 11:01:00

## 2025-04-30 RX ADMIN — SODIUM CHLORIDE, SODIUM LACTATE, POTASSIUM CHLORIDE, CALCIUM CHLORIDE: 600; 310; 30; 20 INJECTION, SOLUTION INTRAVENOUS at 11:31:00

## 2025-04-30 RX ADMIN — KETOROLAC TROMETHAMINE 30 MG: 30 INJECTION, SOLUTION INTRAMUSCULAR; INTRAVENOUS at 11:24:00

## 2025-04-30 RX ADMIN — ONDANSETRON 4 MG: 2 INJECTION INTRAMUSCULAR; INTRAVENOUS at 11:08:00

## 2025-04-30 RX ADMIN — SODIUM CHLORIDE, SODIUM LACTATE, POTASSIUM CHLORIDE, CALCIUM CHLORIDE: 600; 310; 30; 20 INJECTION, SOLUTION INTRAVENOUS at 10:59:00

## 2025-04-30 RX ADMIN — MIDAZOLAM HYDROCHLORIDE 2 MG: 1 INJECTION INTRAMUSCULAR; INTRAVENOUS at 10:58:00

## 2025-04-30 NOTE — OPERATIVE REPORT
OPERATIVE REPORT  DATE OF SURGERY: 4/30/2025  PREOPERATIVE DIAGNOSIS: right kidney stone  POSTOPERATIVE DIAGNOSIS: same  PROCEDURE: Cystoscopy, right Ureteroscopy, laser lithotripsy, right retrograde pyelogram, placement of 6 x 26cm double j stent on  a string  SURGEON: Elizabeth Alegria MD  ASSISTANT: none  ANESTHESIA: general  FLUIDS: per anesthesia  URINE OUTPUT: n/a  ESTIMATED BLOOD LOSS: 3cc  SPECIMENS: right kidney stone  CONDITION: Stable to PACU    INDICATIONS: right kidney stone  PROCEDURE: The patient was met in the preoperative holding area. Appropriate informed consent was obtained and the patient was brought to the operative suite. ?Appropriate timeout was performed per hospital protocol. After the successful induction of general anesthesia, the patient was placed in the dorsal lithotomy position. ?Pressure points were meticulously padded. The patient was prepped and draped in a standard sterile fashion and IV ancef was given as preoperative prophylaxis. At this point, we passed a 22-Venezuelan cystoscope per urethra into the bladder. ?We identified the right ureteric orifice and used fluoroscopic guidance to cannulate this with a .038 Sensor guidewire to the level of the kidney. ?A dual lumen catheter was then passed into the proximal ureter and a second wire was passed through the dual lumen catheter and the dual lumen catheter was removed. We kept one wire as a safety wire and an over our working wire, we passed a 12/14-Venezuelan ureteral access sheath keeping one wire behind as a safety wire. We then passed a ureteroscope through the access sheath up into the kidney. The stone was seen in the mid pole and was small in size. We brought in the 200 nanometer Track tip laser fiber on variable settings we ablated into smaller pieces. The tipless nitinol basket was used to completely extract all of the significant fragments and then we confirmed this with ureteroscopy again. ?We brought the laser back in and  dusted a few more tiny fragments with the laser, but then there was nothing significant remaining. ?At this point, the laser fiber was removed and the basket was removed. ?The access sheath was removed while scoping the entire ureter without evidence of any trauma or stone. A retrograde pyelogram was performed which demosntrated delicate calyces.?Using the safety wire and fluoroscopic guidance, we passed a 6-Zimbabwean x 26 cm stent with a nice coil in the right kidney and a nice coil in the bladder, the string was left. We secured the string to the mons with Mastisol, steri strips and Tegaderm. ?The procedure was then complete. Patient was awoken from anesthesia and taken to the recovery room in stable condition. All counts were correct x2.    IMPRESSION: s/p right urs all stones treated - stent on string    PLAN:  Stent x 7 days  Rbus 6-8 weeks  3d abx

## 2025-04-30 NOTE — ANESTHESIA PROCEDURE NOTES
Airway  Date/Time: 4/30/2025 11:04 AM  Reason: Elective    Airway not difficult    General Information and Staff   Patient location during procedure: OR  Anesthesiologist: Aaron Hampton MD  Resident/CRNA: Dari Leal CRNA  Performed: CRNA   Performed by: Dari Leal CRNA  Authorized by: Aaron Hampton MD        Indications and Patient Condition  Indications for airway management: anesthesia  Sedation level: deep      Preoxygenated: yesPatient position: sniffing    Mask difficulty assessment: 1 - vent by mask    Final Airway Details    Final airway type: endotracheal airway    Successful airway: ETT  Cuffed: yes   Successful intubation technique: direct laryngoscopy  Endotracheal tube insertion site: oral  Blade: Paco  Blade size: #3  ETT size (mm): 7.0    Cormack-Lehane Classification: grade I - full view of glottis  Placement verified by: capnometry   Cuff volume (mL): 10  Measured from: lips  ETT to lips (cm): 20  Number of attempts at approach: 1  Number of other approaches attempted: 0

## 2025-04-30 NOTE — ANESTHESIA PREPROCEDURE EVALUATION
Anesthesia PreOp Note    HPI:     Urszula Cook is a 64 year old female who presents for preoperative consultation requested by: Elizabeth Alegria MD    Date of Surgery: 4/30/2025    Procedure(s):  Cystoscopy left ureteroscopy, left laser lithotripsy, left retrograde pyelogram, left stent placement; possible right stent and retrograde  Cystoscopy with holmium laser  Cystoscopy retrograde  Cystoscopy stent insertion  Indication: Nephrolithiasis [N20.0]    Relevant Problems   No relevant active problems       NPO:  Last Liquid Consumption Date: 04/29/25  Last Liquid Consumption Time: 2115  Last Solid Consumption Date: 04/29/25  Last Solid Consumption Time: 2115  Last Liquid Consumption Date: 04/29/25          History Review:  Patient Active Problem List    Diagnosis Date Noted    Internal hemorrhoids without complication 03/07/2019    Routine adult health maintenance 11/05/2015    Screening for colon cancer 11/05/2015    Ganglion, unspecified 06/20/2013       Past Medical History[1]    Past Surgical History[2]    Prescriptions Prior to Admission[3]  Current Medications and Prescriptions Ordered in Epic[4]    Allergies[5]    Family History[6]  Social Hx on file[7]    Available pre-op labs reviewed.  Lab Results   Component Value Date    WBC 5.6 04/24/2025    RBC 3.89 04/24/2025    HGB 12.3 04/24/2025    HCT 36.6 04/24/2025    MCV 94.1 04/24/2025    MCH 31.6 04/24/2025    MCHC 33.6 04/24/2025    RDW 11.8 04/24/2025    .0 04/24/2025     Lab Results   Component Value Date     04/24/2025    K 4.1 04/24/2025     04/24/2025    CO2 25.0 04/24/2025    BUN 17 04/24/2025    CREATSERUM 1.06 (H) 04/24/2025    GLU 82 04/24/2025    CA 9.7 04/24/2025     Lab Results   Component Value Date    INR 0.93 04/24/2025       Vital Signs:  Body mass index is 22.8 kg/m².   height is 1.651 m (5' 5\") and weight is 62.1 kg (137 lb). Her oral temperature is 97.5 °F (36.4 °C). Her blood pressure is 123/68 and her pulse is  65. Her respiration is 16 and oxygen saturation is 100%.   Vitals:    04/28/25 1423 04/30/25 0935   BP:  123/68   Pulse:  65   Resp:  16   Temp:  97.5 °F (36.4 °C)   TempSrc:  Oral   SpO2:  100%   Weight: 61.2 kg (135 lb) 62.1 kg (137 lb)   Height: 1.651 m (5' 5\")         Anesthesia Evaluation     Patient summary reviewed and Nursing notes reviewed    No history of anesthetic complications   Airway   Mallampati: I  TM distance: >3 FB  Neck ROM: full  Dental - Dentition appears grossly intact     Pulmonary - negative ROS and normal exam   Cardiovascular - negative ROS and normal exam    Neuro/Psych - negative ROS     GI/Hepatic/Renal - negative ROS     Endo/Other - negative ROS   Abdominal  - normal exam                 Anesthesia Plan:   ASA:  2  Plan:   General  Airway:  ETT  Post-op Pain Management: IV analgesics  Informed Consent Plan and Risks Discussed With:  Patient  Consent Comment: I have discussed the anesthetic plan, major risks and alternatives with the patient and answered all questions.  Minor and major side effects were discussed with patient, including but not limited to: injury to teeth/gums/lips, aspiration, nausea/vomiting postoperatively, anaphylaxis, heart attack, stroke, post-operative ventilation and death. The patient desires to proceed with surgery and anesthesia as planned. All questions answered.    Discussed plan with:  Attending      I have informed Urszula Cook and/or legal guardian or family member of the nature of the anesthetic plan, benefits, risks including possible dental damage if relevant, major complications, and any alternative forms of anesthetic management.   All of the patient's questions were answered to the best of my ability. The patient desires the anesthetic management as planned.  NIA GARDUNO CRNA  4/30/2025 10:28 AM  Present on Admission:  **None**           [1]   Past Medical History:   Arm mass    per NG Rt arm    Basal cell carcinoma of face    per NG  left face; excision    Brain tumor (HCC)    per NG surgery at [facility]-    Calculus of kidney    Cancer (HCC)    Hernia of unspecified site of abdominal cavity without mention of obstruction or gangrene    per NG hernia    Internal hemorrhoids without complication    Migraines    Osteoarthritis    Raynaud's disease   [2]   Past Surgical History:  Procedure Laterality Date    Colonoscopy N/A 2019    Procedure: COLONOSCOPY;  Surgeon: Angela Marx MD;  Location: [facility] ENDOSCOPY    Excis primary ganglion wrist Right     per NG Rig wrist ganglion removal    Hernia surgery      per NG repair    Needle biopsy left  2019    us guided bx      94, 97    Other surgical history Right     per NG benign removed (rt arm)    Other surgical history      Excision brain tumor     Repair rotator cuff,acute      Skin surgery      Skin cancer    Tubal ligation      Upper arm/elbow surgery unlisted      per NG shoulder surgery   [3]   Medications Prior to Admission   Medication Sig Dispense Refill Last Dose/Taking    Cholecalciferol (VITAMIN D) 25 MCG (1000 UT) Oral Tab    2025 Morning    Calcium 500 MG Oral Chew Tab Chew 2 each by mouth in the morning.   2025 Morning    Probiotic Product (PROBIOTIC-10) Oral Cap Take by mouth.   2025 Morning    tamsulosin (FLOMAX) 0.4 MG Oral Cap Take 1 capsule (0.4 mg total) by mouth daily. (Patient not taking: Reported on 2025) 30 capsule 0 Not Taking   [4]   Current Facility-Administered Medications Ordered in Epic   Medication Dose Route Frequency Provider Last Rate Last Admin    lactated ringers infusion   Intravenous Continuous Elizabeth Alegria MD 20 mL/hr at 25 1011 New Bag at 25 1011    ceFAZolin (Ancef) 2g in 10mL IV syringe premix  2 g Intravenous Once Elizabeth Alegria MD         Current Outpatient Medications Ordered in Epic   Medication Sig Dispense Refill    sulfamethoxazole-trimethoprim -160  MG Oral Tab per tablet Take 1 tablet by mouth 2 (two) times daily for 3 days. 6 tablet 0    tamsulosin 0.4 MG Oral Cap Take 1 capsule (0.4 mg total) by mouth daily. Take 1/2 hour following the same meal each day 30 capsule 0   [5] No Known Allergies  [6]   Family History  Problem Relation Age of Onset    Basal Cell Self     Diabetes Father     Arthritis Father         per NG osteoarthritis    Hypertension Father     Musculo-skelatal Disorder Father     Ovarian Cancer Sister 58    Cancer Sister     Breast Cancer Neg     Prostate Cancer Neg     Pancreatic Cancer Neg    [7]   Social History  Socioeconomic History    Marital status:    Tobacco Use    Smoking status: Never    Smokeless tobacco: Never   Vaping Use    Vaping status: Never Used   Substance and Sexual Activity    Alcohol use: Never    Drug use: Never    Sexual activity: Yes     Partners: Male   Other Topics Concern    Caffeine Concern Yes     Comment: per NG 1 cup of coffee daily

## 2025-04-30 NOTE — PLAN OF CARE
In to see Pt, talking with  much less sleepy, Pt up to the bathroom with safe stable gait, stated did void, Pt ready to dress and go home

## 2025-04-30 NOTE — H&P
PRE-OP NOTE     NAME/MRN/PROCEDURE: Deana Cook - cystoscopy, left urs/ll/stent  HPI: 64-year-old woman who was noted to have a 4 mm left UVJ stone.  She also had nonobstructing kidney stones as well.  We discussed left ureteroscopy.  She also has right kidney stones.  She wanted to see how the left went before proceeding with right procedure.  I did  her on possible right stent placement and right procedure in 2 weeks  PMH: Arm mass, basal cell carcinoma, nephrolithiasis, hernia, internal hemorrhoids, osteoarthritis, Raynaud's disease  PSH: Colonoscopy, hernia surgery, needle biopsy, rotator cuff, skin surgery, tubal ligation, elbow surgery  MEDS: Calcium, cholecalciferol, Norco, Toradol, probiotic, sulfamethoxazole, Flomax  ALL: No known allergies  LABS: INR 0.93, creatinine 1.06, hematocrit 36.6, platelets 271, urine culture no growth, no positive cultures in the system.  IMAGING:       OTHER: NAD, cooperative, communicative  Nonlabored breathing on room air  Soft, ND, NT, no guarding, no RT  wwp  ABX: ancef       FAMILY HISTORY:  [Family History]    [Family History]        Problem Relation Age of Onset    Basal Cell Self      Diabetes Father      Arthritis Father           per NG osteoarthritis    Hypertension Father      Musculo-skelatal Disorder Father      Ovarian Cancer Sister 58    Cancer Sister      Breast Cancer Neg      Prostate Cancer Neg      Pancreatic Cancer Neg          SOCIAL HISTORY:  [Short Social Hx on File]    [Short Social Hx on File]  Social History        Socioeconomic History    Marital status:    Tobacco Use    Smoking status: Never    Smokeless tobacco: Never   Vaping Use    Vaping status: Never Used   Substance and Sexual Activity    Alcohol use: Never    Drug use: Never    Sexual activity: Yes       Partners: Male   Other Topics Concern    Caffeine Concern Yes       Comment: per NG 1 cup of coffee daily

## 2025-04-30 NOTE — ANESTHESIA POSTPROCEDURE EVALUATION
Patient: Urszula Cook    Procedure Summary       Date: 04/30/25 Room / Location: [facility] MAIN OR 14 / [facility] OR; [facility] X-ray    Anesthesia Start: 1058 Anesthesia Stop:     Procedures:       XR OR - N/C      Cystoscopy right ureteroscopy, right laser lithotripsy, right retrograde pyelogram, right stent placement; possible right stent and retrograde (Right: Ureter)      Cystoscopy with holmium laser (Right: Ureter)      Cystoscopy retrograde (Right: Ureter)      Cystoscopy stent insertion (Right: Ureter) Diagnosis:       Nephrolithiasis      (Cystoscopy left ureteroscopy, left laser lithotripsy, left retrograde pyelogram, left stent placement; possible right stent and retrograde.)      (Nephrolithiasis [N20.0])    Scheduled Providers: Elizabeth Alegria MD Anesthesiologist: Aaron Hampton MD    Anesthesia Type: general ASA Status: 1            Anesthesia Type: general    Vitals Value Taken Time   /77 04/30/25 11:39   Temp 97.3 °F (36.3 °C) 04/30/25 11:39   Pulse 60 04/30/25 11:39   Resp 23 04/30/25 11:39   SpO2 99 % 04/30/25 11:39   Vitals shown include unfiled device data.    [facility] AN Post Evaluation:   Patient Evaluated in PACU  Patient Participation: complete - patient participated  Level of Consciousness: sleepy but conscious  Pain Score: 0  Pain Management: adequate  Airway Patency:patent  Dental exam unchanged from preop  Yes    Cardiovascular Status: stable and acceptable  Respiratory Status: acceptable and room air  Postoperative Hydration acceptable      NIA GARDUNO CRNA  4/30/2025 11:40 AM

## 2025-04-30 NOTE — TELEPHONE ENCOUNTER
Hi can you schedule for nursing visit stent on a string removal in 7 days    Needs fu with me in 6-8 weeks with rbus prior

## 2025-04-30 NOTE — PLAN OF CARE
Per policy- Discharge from PACU to a Nursing Unit, PACU_2.05    Jose Guadalupe Post Anesthesia Recovery Score per PACU staff= 10 @ 12:00    Patients are discharged from the PACU or recovery area when they have achieved a score of 8 or greater on the Jose Guadalupe Post Anesthesia Recovery Score for Discharge to an inpatient room or Pre-op Recovery. Patients may be discharged to CCU/PCCU with scores <8.    ACTIVITY  Able to move 4 extremities voluntarily or on command=2  Able to move 2 extremities voluntarily or on command=1  Unable to move extremities voluntarily or on command=0    RESPIRATION  Able to breathe deeply and cough freely=2  Dyspneic, limited breathing or tachypnea=1  Apneic or on mechanical ventilator=0    CIRCULATION  BP +/- 20% of preanesthetic level=2  BP +/- 20-49 % of preanesthetic level=1  BP +/- 51% of preanesthetic level=0    CONSCIOUSNESS  Fully Awake=2  Arousable on calling=1  Not responding=0    Oxygen Saturation  Able to maintain baseline room air=2  Need 02 inhalation to maintain 02 Saturation >90%=1  02 Saturation <90% even with 02 supplement=0    PAIN=2  Pain is at a level of 4 or below or at 50% of the incoming pain rating.     Report from leonardo DASILVA RN  \"Oneyda\"

## 2025-04-30 NOTE — PLAN OF CARE
Pt arrived to Post-op SDS department. Arrived via cart by OR transport. Here with  and called into room after Pt assessment completed. Encouraged to drink and eat and void. All belongings at bedside.     Postop sign and held orders reviewed, released and followed.     Pt in no acute distress. All questions and needs acknowledged and addressed to satisfaction. Pt given call light and aware to call with needs.

## 2025-05-01 NOTE — TELEPHONE ENCOUNTER
Spoke with patient, assisted in scheduling nurse visit and follow up visit. PT aware she can schedule ultrasound via my chart before upcoming visit.     Future Appointments   Date Time Provider Department Center   5/8/2025  8:20 AM [facility]   6/26/2025  8:00 AM Elizabeth Alegria MD [facility]

## 2025-05-06 ENCOUNTER — PATIENT MESSAGE (OUTPATIENT)
Dept: SURGERY | Facility: CLINIC | Age: 65
End: 2025-05-06

## 2025-05-08 ENCOUNTER — NURSE ONLY (OUTPATIENT)
Dept: SURGERY | Facility: CLINIC | Age: 65
End: 2025-05-08
Payer: COMMERCIAL

## 2025-05-08 NOTE — PROGRESS NOTES
-Pt presents to Urology for NV to remove Dangle stent following 25 surgery w/ Dr. Alegria; identity verified with name & .  -Pt positions self on exam table; draped for privacy to lower sweats/ underwear to mid-thigh; remove steri-strips using alcohol wipe; pt instructed to breath in nose/ out mouth & Dangle slowly removed; pt given wipes & she redressed.  -Pt questioned if she should continue taking Flomax.  Per AR, recommends to complete prescription for pain management.  -Encounter complete.

## 2025-05-12 LAB
HYDROXYAPATITE: 100 %
WEIGHT-STONE: 54 MG

## 2025-05-14 LAB
CAOX MONOHYDRATE: 10 %
HYDROXYAPATITE: 90 %
WEIGHT-STONE: 14 MG

## 2025-06-12 ENCOUNTER — PATIENT MESSAGE (OUTPATIENT)
Dept: INTERNAL MEDICINE CLINIC | Facility: CLINIC | Age: 65
End: 2025-06-12

## 2025-06-12 DIAGNOSIS — Z78.0 ASYMPTOMATIC MENOPAUSE: Primary | ICD-10-CM

## 2025-06-12 DIAGNOSIS — Z13.6 SCREENING FOR CARDIOVASCULAR CONDITION: ICD-10-CM

## 2025-06-17 NOTE — TELEPHONE ENCOUNTER
Patient unable to schedule dexa scan as order has future expected date of: 03/12/2026.  Please see order pended below and sign off if in agreement.

## 2025-06-18 ENCOUNTER — HOSPITAL ENCOUNTER (OUTPATIENT)
Dept: ULTRASOUND IMAGING | Age: 65
Discharge: HOME | End: 2025-06-18
Attending: UROLOGY
Payer: COMMERCIAL

## 2025-06-18 DIAGNOSIS — N20.0 NEPHROLITHIASIS: ICD-10-CM

## 2025-06-18 PROCEDURE — 76770 US EXAM ABDO BACK WALL COMP: CPT | Performed by: UROLOGY

## 2025-06-20 ENCOUNTER — HOSPITAL ENCOUNTER (OUTPATIENT)
Dept: CT IMAGING | Age: 65
Discharge: HOME | End: 2025-06-20
Attending: INTERNAL MEDICINE

## 2025-06-20 DIAGNOSIS — Z13.6 SCREENING FOR CARDIOVASCULAR CONDITION: ICD-10-CM

## 2025-06-23 ENCOUNTER — PATIENT MESSAGE (OUTPATIENT)
Dept: INTERNAL MEDICINE CLINIC | Facility: CLINIC | Age: 65
End: 2025-06-23

## 2025-06-26 ENCOUNTER — OFFICE VISIT (OUTPATIENT)
Dept: SURGERY | Facility: CLINIC | Age: 65
End: 2025-06-26
Payer: COMMERCIAL

## 2025-06-26 DIAGNOSIS — N20.0 NEPHROLITHIASIS: Primary | ICD-10-CM

## 2025-06-26 PROCEDURE — 99213 OFFICE O/P EST LOW 20 MIN: CPT | Performed by: UROLOGY

## 2025-06-26 NOTE — PROGRESS NOTES
Elizabeth Alegria MD  Department of Urology  [address] [place] [address] Rd[address]  [place] [address]    T: [phone]  F: [phone]    To: Zandra Powell MD   [address] [place] [address]    Re: Urszula Cook   MRN: [id number]  : 1960    Dear Zandra Powell MD,    Today I had the pleasure of seeing Urszula Cook in my clinic. As you know, Ms. Cook is a pleasant 64 year old year old female who I am seeing for follow-up. Patient was last seen in this department on 4/10/2025.    Briefly, patient had a right kidney stone and underwent right ureteroscopy with me on 2025.  It was uncomplicated and her stent was left on a string.  Her stone analysis was calcium oxalate and hydroxyapatite.  She did have a follow-up ultrasound which demonstrated no hydroureteronephrosis bilaterally       PAST MEDICAL HISTORY:  Past Medical History[1]     PAST SURGICAL HISTORY:  Past Surgical History[2]      ALLERGIES:  Allergies[3]      MEDICATIONS:  Current Outpatient Medications   Medication Instructions    Calcium 500 MG Oral Chew Tab 2 each, Daily    Cholecalciferol (VITAMIN D) 25 MCG (1000 UT) Oral Tab No dose, route, or frequency recorded.    Probiotic Product (PROBIOTIC-10) Oral Cap Take by mouth.        FAMILY HISTORY:  Family History[4]     SOCIAL HISTORY:  Short Social Hx on File[5]       PHYSICAL EXAMINATION:  There were no vitals filed for this visit.  CONSTITUTIONAL: No apparent distress, cooperative and communicative  NEUROLOGIC: Alert and oriented   HEAD: Normocephalic, atraumatic   EYES: Sclera non-icteric   ENT: Hearing intact, moist mucous membranes   NECK: No obvious goiter or masses   RESPIRATORY: Normal respiratory effort, Nonlabored breathing on room air  SKIN: No evident rashes   ABDOMEN: no obvious masses, no obvious distension    REVIEW OF SYSTEMS:    A comprehensive 10-point review of systems was completed.  Pertinent positives and negatives are noted in the the HPI.        LABORATORY DATA:      Component  Ref Range & Units (hover)    Source-Stone Comment   Comment: Not provided   Color-Stone Tan   Size-Stone 3x3   Comment: Single piece received.   Weight-Stone 14   CaOx Monohydrate 10   Hydroxyapatite 90   Resulting Agency [facility]              IMAGING REVIEW:  PROCEDURE: US KIDNEY/BLADDER (CPT=76770)      COMPARISON: [facility],  KIDNEYS (CPT=76775), 11/23/2022, 2:04 PM.      INDICATIONS: Nephrolithiasis      TECHNIQUE: Ultrasound examination was performed to visualize the kidneys and bladder.       FINDINGS:   RIGHT KIDNEY: Normal.  Right kidney length is 9.50 cm.  Normal echotexture .  There is a 0.73 x 0.37 x 0.55 cm nonobstructing stone within the superior pole of the right kidney.  No hydronephrosis, mass,  or perirenal fluid.    LEFT KIDNEY: Normal.  Left kidney length is 9.65  cm.  Normal echotexture.  Multiple nonobstructing stones.  There largest nonobstructing stone measures 0.42 x 0.26 x 0.56 cm in the midpole of the left kidney.  No hydronephrosis, mass, or perirenal   fluid.    BLADDER: Normal.  Bilateral jets are noted.  No visible wall thickening, mass, or calculus.       CONCLUSION:      1.  Nonobstructing nephrolithiasis bilaterally.      2.  No evidence for hydronephrosis bilaterally.      OTHER RELEVANT DATA:   none     IMPRESSION: Status post right ureteroscopy with all stones treated.  Follow-up ultrasound demonstrated no silent hydroureteronephrosis.    She passed her left stone so we did not proceed with left ureteroscopy.  She does have nonobstructing stones on the left side for which I discussed observation versus definitive ureteroscopy. She opted for observation.    Patient was counseled on stone prevention methods including hydration (until urine is clear), limiting salt and red meat/meat intake, eating a normal calcium diet, and drinking lemon with water. We also talked about reasons to go to the emergency room -  namely acute flank pain associated with fevers, chills, nausea or vomiting.    24h urine testing offered, she accepted     PLAN:  24h urine testing in 6 weeks  RTC in 8 weeks  Flank pain and fevers --> ER  She knows she has left stones, and opted for observation    Thank you for referring this very pleasant patient to my clinic. If you have any questions or concerns, please do not hesitate to contact me.    Sincerely,  Elizabeth Alegria MD    30 minutes were spent on this patient at this visit obtaining a history, reviewing medical records, developing a treatment plan, counseling and discussing treatment strategy with patient, coordination of care and documentation.     The  Cent[employer] Cures Act makes medical notes available to patients in the interest of transparency.  However, please be advised that this is a medical document.  It is intended as a peer to peer communication.  It is written in medical language and may contain abbreviations or verbiage that are technical and unfamiliar.  It may appear blunt or direct.  Medical documents are intended to carry relevant information, facts as evident, and the clinical opinion of the practitioner.         [1]   Past Medical History:   Arm mass    per NG Rt arm    Basal cell carcinoma of face    per NG left face; excision    Brain tumor (HCC)    per NG surgery at [facility]-    Calculus of kidney    Cancer (HCC)    Hernia of unspecified site of abdominal cavity without mention of obstruction or gangrene    per NG hernia    Internal hemorrhoids without complication    Migraines    Osteoarthritis    Raynaud's disease   [2]   Past Surgical History:  Procedure Laterality Date    Colonoscopy N/A 2019    Procedure: COLONOSCOPY;  Surgeon: Angela Marx MD;  Location: [facility] ENDOSCOPY    Excis primary ganglion wrist Right     per NG Righ wrist ganglion removal    Hernia surgery      per NG repair    Needle biopsy left  2019    us guided bx       12/24/94, 5/19/97    Other surgical history Right 1997    per NG benign removed (rt arm)    Other surgical history      Excision brain tumor 2003    Repair rotator cuff,acute      Skin surgery      Skin cancer    Tubal ligation      Upper arm/elbow surgery unlisted  2010    per NG shoulder surgery   [3] No Known Allergies  [4]   Family History  Problem Relation Age of Onset    Basal Cell Self     Diabetes Father     Arthritis Father         per NG osteoarthritis    Hypertension Father     Musculo-skelatal Disorder Father     Ovarian Cancer Sister 58    Cancer Sister     Breast Cancer Neg     Prostate Cancer Neg     Pancreatic Cancer Neg    [5]   Social History  Socioeconomic History    Marital status:    Tobacco Use    Smoking status: Never    Smokeless tobacco: Never   Vaping Use    Vaping status: Never Used   Substance and Sexual Activity    Alcohol use: Never    Drug use: Never    Sexual activity: Yes     Partners: Male   Other Topics Concern    Caffeine Concern Yes     Comment: per NG 1 cup of coffee daily

## 2025-07-08 NOTE — TELEPHONE ENCOUNTER
Dr. Powell please review patient mychart message regarding CT chest results:      Hi Dr. Powell,     I'm sorry I missed your message.  No, I have not had any cold symptoms or cough.  The results are a little concerning to me.  Why would it show possible bronchitis or asthma, when having no symptoms?  Should I be concerned?       Thanks!

## 2025-07-09 ENCOUNTER — HOSPITAL ENCOUNTER (OUTPATIENT)
Dept: BONE DENSITY | Age: 65
Discharge: HOME | End: 2025-07-09
Attending: INTERNAL MEDICINE
Payer: COMMERCIAL

## 2025-07-09 DIAGNOSIS — Z78.0 ASYMPTOMATIC MENOPAUSE: ICD-10-CM

## 2025-07-09 PROCEDURE — 77080 DXA BONE DENSITY AXIAL: CPT | Performed by: INTERNAL MEDICINE

## 2025-07-28 ENCOUNTER — TELEPHONE (OUTPATIENT)
Dept: SURGERY | Facility: CLINIC | Age: 65
End: 2025-07-28

## 2025-08-09 ENCOUNTER — LAB ENCOUNTER (OUTPATIENT)
Dept: LAB | Age: 65
End: 2025-08-09
Attending: UROLOGY

## 2025-08-09 DIAGNOSIS — N20.0 NEPHROLITHIASIS: ICD-10-CM

## 2025-08-09 PROCEDURE — 83945 ASSAY OF OXALATE: CPT

## 2025-08-09 PROCEDURE — 82507 ASSAY OF CITRATE: CPT

## 2025-08-09 PROCEDURE — 83735 ASSAY OF MAGNESIUM: CPT

## 2025-08-09 PROCEDURE — 84392 ASSAY OF URINE SULFATE: CPT

## 2025-08-09 PROCEDURE — 81050 URINALYSIS VOLUME MEASURE: CPT

## 2025-08-09 PROCEDURE — 82436 ASSAY OF URINE CHLORIDE: CPT

## 2025-08-09 PROCEDURE — 84133 ASSAY OF URINE POTASSIUM: CPT

## 2025-08-09 PROCEDURE — 84560 ASSAY OF URINE/URIC ACID: CPT

## 2025-08-09 PROCEDURE — 83986 ASSAY PH BODY FLUID NOS: CPT

## 2025-08-09 PROCEDURE — 84300 ASSAY OF URINE SODIUM: CPT

## 2025-08-09 PROCEDURE — 84105 ASSAY OF URINE PHOSPHORUS: CPT

## 2025-08-09 PROCEDURE — 82340 ASSAY OF CALCIUM IN URINE: CPT

## 2025-08-19 LAB
AMMONIA, URINE: 28 MMOL/24 HR
CHLORIDE URINE: 134 MMOL/24 HR
CITRIC ACID(CITRATE): <34 MG/24 HR
CREATININE, URINE: 1128 MG/24 HR
CREATININE/KG BODY WEIGHT, URINE: 19.1 MG/24 HR/KG
CREATININE/KG BODY WEIGHT, URINE: 19.1 MG/24 HR/KG
LC CALCIUM OXALATE SATURATION, URINE: 6.91
LC CALCIUM PHOSPHATE SATURATION, URINE: 1.66
LC CALCIUM, URINE: 185 MG/24 HR
LC CALCIUM/CREATININE RATIO, URINE: 164 MG/G CREAT
LC CALCIUM/KG BODY WEIGHT, URINE: 3.1 MG/24 HR/KG
MAGNESIUM, URINE: 73 MG/24 HR
OXALATES, URINE: 47 MG/24 HR
PH, 24 HR URINE: 6.88
PHOSPHORUS, URINE: 761 MG/24 HR
POTASSIUM, URINE: 80 MMOL/24 HR
PROTEIN CATABOLIC RATE, URINE: 1.3 G/KG/24 HR
PROTEIN CATABOLIC RATE, URINE: 1.3 G/KG/24 HR
SODIUM, URINE: 127 MMOL/24 HR
SULFATE, URINE: 39 MEQ/24 HR
UREA NITROGEN, URINE: 10.03 G/24 HR
UREA NITROGEN, URINE: 10.03 G/24 HR
URIC ACID SATURATION, URINE: 0.08
URIC ACID SATURATION, URINE: 0.08
URIC ACID, URINE: 520 MG/24 HR
URINE VOLUME (PRESERVATIVE): 2240 ML/24 HR

## 2025-08-25 ENCOUNTER — OFFICE VISIT (OUTPATIENT)
Dept: SURGERY | Facility: CLINIC | Age: 65
End: 2025-08-25

## 2025-08-25 DIAGNOSIS — N20.0 NEPHROLITHIASIS: Primary | ICD-10-CM

## 2025-08-25 PROCEDURE — 99213 OFFICE O/P EST LOW 20 MIN: CPT | Performed by: UROLOGY

## (undated) DEVICE — ENDOSCOPIC VALVE WITH ADAPTER.: Brand: SURSEAL® II

## (undated) DEVICE — SOLUTION IRRIG 3000ML 0.9% NACL FLX CONT

## (undated) DEVICE — NITINOL WIRE WITH HYDROPHILIC TIP: Brand: SENSOR

## (undated) DEVICE — GAMMEX® PI HYBRID SIZE 6, STERILE POWDER-FREE SURGICAL GLOVE, POLYISOPRENE AND NEOPRENE BLEND: Brand: GAMMEX

## (undated) DEVICE — UROLOGY DRAIN BAG

## (undated) DEVICE — GAMMEX® PI HYBRID SIZE 8, STERILE POWDER-FREE SURGICAL GLOVE, POLYISOPRENE AND NEOPRENE BLEND: Brand: GAMMEX

## (undated) DEVICE — SLEEVE COMPR MD KNEE LEN SGL USE KENDALL SCD

## (undated) DEVICE — SNAP KOVER: Brand: UNBRANDED

## (undated) DEVICE — CATHETER URET 5FR L70CM FLX OPN TIP NONPORTED

## (undated) DEVICE — MASK PROC MASK SOFT WHITE

## (undated) DEVICE — PAD,EYE,LARGE,2 1/8"X2 5/8",STERILE,LF: Brand: MEDLINE

## (undated) DEVICE — DUAL LUMEN URETERAL CATHETER

## (undated) DEVICE — URETERAL ACCESS SHEATH SET: Brand: NAVIGATOR HD

## (undated) DEVICE — Device: Brand: CUSTOM PROCEDURE KIT

## (undated) DEVICE — LINE MNTR ADLT SET O2 INTMD

## (undated) DEVICE — JELLY,LUBE,STERILE,FLIP TOP,TUBE,2-OZ: Brand: MEDLINE

## (undated) DEVICE — 6 ML SYRINGE LUER-LOCK TIP: Brand: MONOJECT

## (undated) DEVICE — HYDROGEL COATED URETERAL DILATOR: Brand: NOTTINGHAM ONE-STEP

## (undated) DEVICE — SOLUTION IRRIG 1000ML ST H2O AQUALITE PLAS

## (undated) DEVICE — FIBER LSR 200UM 2J 80HZ 60W DL FOR LITHO

## (undated) DEVICE — STERILE LATEX POWDER-FREE SURGICAL GLOVESWITH NITRILE COATING: Brand: PROTEXIS

## (undated) DEVICE — SOLUTION IRRIG 1000ML 0.9% NACL USP BTL

## (undated) DEVICE — Device: Brand: DEFENDO AIR/WATER/SUCTION AND BIOPSY VALVE

## (undated) DEVICE — 3M™ STERI-STRIP™ REINFORCED ADHESIVE SKIN CLOSURES, R1547, 1/2 IN X 4 IN (12 MM X 100 MM), 6 STRIPS/ENVELOPE: Brand: 3M™ STERI-STRIP™

## (undated) DEVICE — FLEXOR, URETERAL ACCESS SHEATH WITH AQ, HYDROPHILIC COATING: Brand: FLEXOR

## (undated) DEVICE — TOWEL,OR,DSP,ST,BLUE,DLX,2/PK,40PK/CS: Brand: MEDLINE

## (undated) DEVICE — 3 ML SYRINGE LUER-LOCK TIP: Brand: MONOJECT

## (undated) DEVICE — NITINOL STONE RETRIEVAL BASKET: Brand: ZERO TIP

## (undated) DEVICE — PACK CUSTOM CYSTO

## (undated) DEVICE — SINGLE-USE DIGITAL FLEXIBLE URETEROSCOPE: Brand: LITHOVUE™ ELITE

## (undated) DEVICE — MEDI-VAC NON-CONDUCTIVE SUCTION TUBING 6MM X 1.8M (6FT.) L: Brand: CARDINAL HEALTH

## (undated) NOTE — LETTER
07/28/25        Urszula Cook  [address] [facility] [address]      Dear [place],    Our records indicate that you have outstanding lab work and or testing that was ordered for you and has not yet been completed:    Supersaturation Profile Urine     To provide you with the best possible care, please complete these orders at your earliest convenience. If you have recently completed these orders please disregard this letter.     If you have any questions please call the office at [phone].    Thank you,       Urology Staff

## (undated) NOTE — LETTER
[address], [place]  Authorization for Invasive Procedures  1.  I hereby authorize Dr. Oralia Arnold , my physician and whomever may be designated as the doctor's assistant, to perform the following operation and/or procedure:  Steffi Preciado performed for the purposes of advancing medicine, science, and/or education, provided my identity is not revealed. If the procedure has been videotaped, the physician/surgeon will obtain the original videotape.  The hospital will not be responsible for stor My signature below affirms that prior to the time of the procedure, I have explained to the patient and/or her legal representative, the risks and benefits involved in the proposed treatment and any reasonable alternative to the proposed treatment.  I have

## (undated) NOTE — LETTER
01/05/18        [address] [facility] [place]      Dear [place],    [address] [facility] records indicate that you have outstanding lab work and or testing that was ordered for you and has not yet been completed:          CBC W Differential W